# Patient Record
Sex: MALE | Race: WHITE | NOT HISPANIC OR LATINO | URBAN - METROPOLITAN AREA
[De-identification: names, ages, dates, MRNs, and addresses within clinical notes are randomized per-mention and may not be internally consistent; named-entity substitution may affect disease eponyms.]

---

## 2020-06-08 ENCOUNTER — EMERGENCY (EMERGENCY)
Facility: HOSPITAL | Age: 80
LOS: 1 days | Discharge: HOME | End: 2020-06-08
Attending: EMERGENCY MEDICINE
Payer: MEDICARE

## 2020-06-08 VITALS
RESPIRATION RATE: 18 BRPM | OXYGEN SATURATION: 98 % | WEIGHT: 184.97 LBS | TEMPERATURE: 98 F | SYSTOLIC BLOOD PRESSURE: 161 MMHG | HEIGHT: 72 IN | DIASTOLIC BLOOD PRESSURE: 84 MMHG | HEART RATE: 92 BPM

## 2020-06-08 LAB
ALBUMIN SERPL ELPH-MCNC: 4.8 G/DL — SIGNIFICANT CHANGE UP (ref 3.3–5)
ALP SERPL-CCNC: 50 U/L — SIGNIFICANT CHANGE UP (ref 40–120)
ALT FLD-CCNC: 17 U/L — SIGNIFICANT CHANGE UP (ref 10–45)
ANION GAP SERPL CALC-SCNC: 14 MMOL/L — SIGNIFICANT CHANGE UP (ref 5–17)
APPEARANCE UR: CLEAR — SIGNIFICANT CHANGE UP
APTT BLD: 29.3 SEC — SIGNIFICANT CHANGE UP (ref 27.5–36.3)
AST SERPL-CCNC: 25 U/L — SIGNIFICANT CHANGE UP (ref 10–40)
BASOPHILS # BLD AUTO: 0.03 K/UL — SIGNIFICANT CHANGE UP (ref 0–0.2)
BASOPHILS NFR BLD AUTO: 0.4 % — SIGNIFICANT CHANGE UP (ref 0–2)
BILIRUB SERPL-MCNC: 1.4 MG/DL — HIGH (ref 0.2–1.2)
BILIRUB UR-MCNC: NEGATIVE — SIGNIFICANT CHANGE UP
BUN SERPL-MCNC: 30 MG/DL — HIGH (ref 7–23)
CALCIUM SERPL-MCNC: 9.7 MG/DL — SIGNIFICANT CHANGE UP (ref 8.4–10.5)
CHLORIDE SERPL-SCNC: 100 MMOL/L — SIGNIFICANT CHANGE UP (ref 96–108)
CO2 SERPL-SCNC: 24 MMOL/L — SIGNIFICANT CHANGE UP (ref 22–31)
COLOR SPEC: SIGNIFICANT CHANGE UP
CREAT SERPL-MCNC: 1.45 MG/DL — HIGH (ref 0.5–1.3)
DIFF PNL FLD: ABNORMAL
EOSINOPHIL # BLD AUTO: 0.02 K/UL — SIGNIFICANT CHANGE UP (ref 0–0.5)
EOSINOPHIL NFR BLD AUTO: 0.2 % — SIGNIFICANT CHANGE UP (ref 0–6)
GLUCOSE SERPL-MCNC: 117 MG/DL — HIGH (ref 70–99)
GLUCOSE UR QL: NEGATIVE — SIGNIFICANT CHANGE UP
HCT VFR BLD CALC: 45.7 % — SIGNIFICANT CHANGE UP (ref 39–50)
HGB BLD-MCNC: 14.9 G/DL — SIGNIFICANT CHANGE UP (ref 13–17)
IMM GRANULOCYTES NFR BLD AUTO: 0.4 % — SIGNIFICANT CHANGE UP (ref 0–1.5)
INR BLD: 1.04 RATIO — SIGNIFICANT CHANGE UP (ref 0.88–1.16)
KETONES UR-MCNC: NEGATIVE — SIGNIFICANT CHANGE UP
LEUKOCYTE ESTERASE UR-ACNC: NEGATIVE — SIGNIFICANT CHANGE UP
LYMPHOCYTES # BLD AUTO: 1.2 K/UL — SIGNIFICANT CHANGE UP (ref 1–3.3)
LYMPHOCYTES # BLD AUTO: 14.7 % — SIGNIFICANT CHANGE UP (ref 13–44)
MAGNESIUM SERPL-MCNC: 2.1 MG/DL — SIGNIFICANT CHANGE UP (ref 1.6–2.6)
MCHC RBC-ENTMCNC: 30.7 PG — SIGNIFICANT CHANGE UP (ref 27–34)
MCHC RBC-ENTMCNC: 32.6 GM/DL — SIGNIFICANT CHANGE UP (ref 32–36)
MCV RBC AUTO: 94.2 FL — SIGNIFICANT CHANGE UP (ref 80–100)
MONOCYTES # BLD AUTO: 0.67 K/UL — SIGNIFICANT CHANGE UP (ref 0–0.9)
MONOCYTES NFR BLD AUTO: 8.2 % — SIGNIFICANT CHANGE UP (ref 2–14)
NEUTROPHILS # BLD AUTO: 6.2 K/UL — SIGNIFICANT CHANGE UP (ref 1.8–7.4)
NEUTROPHILS NFR BLD AUTO: 76.1 % — SIGNIFICANT CHANGE UP (ref 43–77)
NITRITE UR-MCNC: NEGATIVE — SIGNIFICANT CHANGE UP
NRBC # BLD: 0 /100 WBCS — SIGNIFICANT CHANGE UP (ref 0–0)
PH UR: 6 — SIGNIFICANT CHANGE UP (ref 5–8)
PHOSPHATE SERPL-MCNC: 3.2 MG/DL — SIGNIFICANT CHANGE UP (ref 2.5–4.5)
PLATELET # BLD AUTO: 151 K/UL — SIGNIFICANT CHANGE UP (ref 150–400)
POTASSIUM SERPL-MCNC: 4 MMOL/L — SIGNIFICANT CHANGE UP (ref 3.5–5.3)
POTASSIUM SERPL-SCNC: 4 MMOL/L — SIGNIFICANT CHANGE UP (ref 3.5–5.3)
PROT SERPL-MCNC: 7.4 G/DL — SIGNIFICANT CHANGE UP (ref 6–8.3)
PROT UR-MCNC: NEGATIVE — SIGNIFICANT CHANGE UP
PROTHROM AB SERPL-ACNC: 12 SEC — SIGNIFICANT CHANGE UP (ref 10–12.9)
RBC # BLD: 4.85 M/UL — SIGNIFICANT CHANGE UP (ref 4.2–5.8)
RBC # FLD: 14.6 % — HIGH (ref 10.3–14.5)
SODIUM SERPL-SCNC: 138 MMOL/L — SIGNIFICANT CHANGE UP (ref 135–145)
SP GR SPEC: 1.01 — SIGNIFICANT CHANGE UP (ref 1.01–1.02)
UROBILINOGEN FLD QL: NEGATIVE — SIGNIFICANT CHANGE UP
WBC # BLD: 8.15 K/UL — SIGNIFICANT CHANGE UP (ref 3.8–10.5)
WBC # FLD AUTO: 8.15 K/UL — SIGNIFICANT CHANGE UP (ref 3.8–10.5)

## 2020-06-08 PROCEDURE — 70450 CT HEAD/BRAIN W/O DYE: CPT | Mod: 26

## 2020-06-08 PROCEDURE — 71045 X-RAY EXAM CHEST 1 VIEW: CPT | Mod: 26

## 2020-06-08 PROCEDURE — 93010 ELECTROCARDIOGRAM REPORT: CPT | Mod: GC

## 2020-06-08 PROCEDURE — 99285 EMERGENCY DEPT VISIT HI MDM: CPT | Mod: GC

## 2020-06-08 RX ORDER — SODIUM CHLORIDE 9 MG/ML
1000 INJECTION INTRAMUSCULAR; INTRAVENOUS; SUBCUTANEOUS ONCE
Refills: 0 | Status: COMPLETED | OUTPATIENT
Start: 2020-06-08 | End: 2020-06-08

## 2020-06-08 RX ADMIN — SODIUM CHLORIDE 2000 MILLILITER(S): 9 INJECTION INTRAMUSCULAR; INTRAVENOUS; SUBCUTANEOUS at 21:10

## 2020-06-08 NOTE — ED PROVIDER NOTE - CARE PLAN
Principal Discharge DX:	Tingling of left upper extremity Principal Discharge DX:	Tingling of left upper extremity  Secondary Diagnosis:	MARVEL (acute kidney injury)

## 2020-06-08 NOTE — ED PROVIDER NOTE - PATIENT PORTAL LINK FT
You can access the FollowMyHealth Patient Portal offered by [facility] by registering at the following website: [email or web address]. By joining [employer]’s FollowMyHealth portal, you will also be able to view your health information using other applications (apps) compatible with our system.

## 2020-06-08 NOTE — ED PROVIDER NOTE - CARE PROVIDER_API CALL
Miguel Mays  NEUROLOGY  [address], [address]  [place] [address]  Phone: [phone]  Fax: [phone]  Follow Up Time:

## 2020-06-08 NOTE — ED PROVIDER NOTE - ATTENDING CONTRIBUTION TO CARE
79M, pmh gout, scc of head and neck, bcc, s/p chemo and radiation, presents s/p two episodes of L arm/L thigh tingling, associated with some lightheadedness, each lasting <1 minute. pt states yesterday when awoke had tingling, primarily to L arm but radiated up to shoulder/neck, with some associated tingling to L thigh. lasted for a period of seconds. today had similar presentation however lasted shorter period. denies any associated cp, sob, palpitations, vision changes, weakness, changes to speech, confusion, diff ambulating. no neck pain or trauma. has not experienced similar sx before. no hx of acs. no family hx of MI.     PE: NAD, NCAT, MMM, Trachea midline, No carotid bruits, Normal conjunctiva, lungs CTAB, S1/S2 RRR, Normal perfusion, 2+ radial pulses bilat, Abdomen Soft, NTND, No rebound/guarding, No LE edema, No deformity of extremities, No rashes,  No focal motor or sensory deficits. CN II-XII intact. Visual fields intact. EOMI, PERRLA. Facial sensation equal bilat. Smile and eye closure equal bilat. Hearing intact bilat. Palate elevation equal, tongue protrusion midline. Lateral head rotation equal bilat. 5/5 strength UE and LE bilat. Sensation grossly intact. No pronator drift. Normal finger to nose. Normal gait.     Mild HTN. Pt well appearing. Neuro exam completely wnl. EKG does reveal RBBB, unclear if new. Given numbness radiating to shoulder/jaw, consider acs, check troponin, though no acute ischemic changes on ekg. Much lower suspicion of TIA, episodes lasted <1 minute (felt like someone brushing up on me), check CT head. Check CBC eval for anemia, cmp eval for metabolic derangement. CXR. Re-eval. - Washington Jimenez MD

## 2020-06-08 NOTE — ED ADULT NURSE NOTE - CHIEF COMPLAINT QUOTE
c/o intermittent episodes on left side numbness/tingling in arm/leg/jaw w/ lightheadedness since Sunday. Neg neuro deficits, Hx chemo/radiation

## 2020-06-08 NOTE — ED ADULT NURSE REASSESSMENT NOTE - NS ED NURSE REASSESS COMMENT FT1
Pt updated on POC. Pt will contact pt family as pt will not be d/c until at least 0100 due to repeat Troponin and BMP at 0000.

## 2020-06-08 NOTE — ED PROVIDER NOTE - NSFOLLOWUPINSTRUCTIONS_ED_ALL_ED_FT
1) You were seen in the ER for tingling. The patient has been informed of all concerning signs and symptoms to return to Emergency Department, the necessity to follow up with PMD/Clinic/follow up provided within 2-3 days was explained, and the patient reports understanding of above with capacity and insight. You can look at the discharge papers for some examples of specific signs and symptoms to look out for.    2) Please follow up with NEUROLOGY within a week, especially if tingling continues.     3) Please take Tylenol 650mg every 4-6 hours as needed for pain.    4) Please return to ED for any new or worsening symptoms. Please follow up with NEUROLOGY tomorrow, Call Dr Mays in the morning to confirm appointment for MRI of brain!!    - Please see your Cardiologist tomorrow as scheduled.     2) Please take 4 baby Aspirin once a day for the next 2 days, then ask Dr Mays tomorrow if you can go back to taking just 1-baby aspirin.      3) Please return to ED for any new or worsening symptoms.    4) Your PMD should continue to follow Kidney function. Your Creatine in the hospital was 1.47

## 2020-06-08 NOTE — ED PROVIDER NOTE - OBJECTIVE STATEMENT
79M hx gout, SCC head and neck and basal cell carcinoma s/p chemo and radiating (last time 2 yrs ago) presents with L sided tingling episodic, up to the jaw with some lightheadedness, lasting 1 minute each. 1st episode sunday when he woke, thought he might have slept on it wrong. 2nd episode today while opening his fridge. no falling. Denies any facial droop, slurred speech, difficulty speaking or word finding, difficulty swallowing, focal weakness, LOC, difficulty walking, imbalance, cp, sob.

## 2020-06-08 NOTE — ED ADULT TRIAGE NOTE - CHIEF COMPLAINT QUOTE
c/o intermittent episodes on left side numbness/tingling in arm/leg/jaw w/o lightheadedness since Sunday. Neg neuro deficits, Hx chemo/radiation c/o intermittent episodes on left side numbness/tingling in arm/leg/jaw w/ lightheadedness since Sunday. Neg neuro deficits, Hx chemo/radiation

## 2020-06-08 NOTE — ED PROVIDER NOTE - CLINICAL SUMMARY MEDICAL DECISION MAKING FREE TEXT BOX
79M hx gout, SCC head and neck and basal cell carcinoma s/p chemo and radiating (last time 2 yrs ago) presents with L sided tingling episodic up to the jaw with some lightheadeness, lasting 1 minute each. check lytes and CTH, EKG and trop for acs work up, hydrate and reassess.

## 2020-06-08 NOTE — ED PROVIDER NOTE - PROGRESS NOTE DETAILS
Dr De La Torre: CT head no bleed, lytes wnl. Cr elevated, will recheck after ivf to see if improves. First trop 25, 3 hr rpt pending - no chest pain or SOB. Neuro resident Sent pt info to [facility] for appt tomorrow for MRI.

## 2020-06-08 NOTE — ED PROVIDER NOTE - PHYSICAL EXAMINATION
GEN: Well appearing, well nourished, in no apparent distress.  HEAD: NCAT  HEENT: PERRL, EOMI, no nystagmus, no facial droop, Airway patent, uvula midline, MMM, neck supple, no LAD, no JVD, no raccoon sign, no solorzano sign   LUNG: CTAB, no adventitious sounds, no retractions, no nasal flaring  CV: RRR, no murmurs,   Abd: soft, NTND, no rebound or guarding, BS+ in all quadrants, no CVAT  MSK: WWP, Pulses 2+ in extremities, No edema, no visible deformities, strength 5/5 in all extremities, FROM, no midspine TTP  Neuro:  CN II-XII in tact. AAOx3, Ambulatory with stable gait. sensations in tact in all extremities, neg pronator drift, neg finger to nose,  neg romberg  Skin: Warm and dry, no evidence of rash  Psych: normal mood and affect

## 2020-06-08 NOTE — ED ADULT NURSE NOTE - OBJECTIVE STATEMENT
79 year old Male, PMH: squamous cell ca on right side of neck- tx: chemo and radiation, last tx 2 years 2018, gout, lithotripsy- kidney stone. Patient comes to the ER after experiencing left sided numbness and tingling from toes to jaw on right side on Saturday upon awakening which resolved within a minute. Patient had a similar episode while opening the refrigerator which lasted less than a minute. Patient also reports recent pain in bilateral calves when going to sleep at night. Patient denies numbness and tingling at this time. A&Ox4, breathing spontaneous and unlabored, palpable pulses, skin color normal for ethnicity, moves all extremities, b/l +PERRL, equal strength in upper and lower extremities, ambulates without assistance with steady gait noted. Denies chest pain, shortness of breath, abdominal pain, headache, vision changes, fevers, chills, cough, nausea, vomiting, diarrhea, urinary symptoms. Bed locked and in lowest position with side rails up for safety.

## 2020-06-08 NOTE — ED PROVIDER NOTE - NS ED ROS FT
Constitutional: no fevers, no chills.  Eyes: no visual changes.  Ears: no ear drainage, no ear pain.  Nose: no nasal congestion.  Mouth/Throat: no sore throat.  Cardiovascular: no chest pain.  Respiratory: no shortness of breath, no wheezing, no cough  Gastrointestinal: no nausea, no vomiting, no diarrhea, no abdominal pain.  MSK: no flank pain, no back pain.  Genitourinary: no dysuria, no hematuria.  Skin: no rashes.  Neuro: no headache +tingling L sided

## 2020-06-09 VITALS
OXYGEN SATURATION: 98 % | DIASTOLIC BLOOD PRESSURE: 76 MMHG | TEMPERATURE: 98 F | RESPIRATION RATE: 18 BRPM | HEART RATE: 75 BPM | SYSTOLIC BLOOD PRESSURE: 138 MMHG

## 2020-06-09 LAB
ANION GAP SERPL CALC-SCNC: 12 MMOL/L — SIGNIFICANT CHANGE UP (ref 5–17)
BUN SERPL-MCNC: 29 MG/DL — HIGH (ref 7–23)
CALCIUM SERPL-MCNC: 9.3 MG/DL — SIGNIFICANT CHANGE UP (ref 8.4–10.5)
CHLORIDE SERPL-SCNC: 104 MMOL/L — SIGNIFICANT CHANGE UP (ref 96–108)
CO2 SERPL-SCNC: 22 MMOL/L — SIGNIFICANT CHANGE UP (ref 22–31)
CREAT SERPL-MCNC: 1.31 MG/DL — HIGH (ref 0.5–1.3)
CULTURE RESULTS: NO GROWTH — SIGNIFICANT CHANGE UP
GLUCOSE SERPL-MCNC: 112 MG/DL — HIGH (ref 70–99)
POTASSIUM SERPL-MCNC: 4 MMOL/L — SIGNIFICANT CHANGE UP (ref 3.5–5.3)
POTASSIUM SERPL-SCNC: 4 MMOL/L — SIGNIFICANT CHANGE UP (ref 3.5–5.3)
SODIUM SERPL-SCNC: 138 MMOL/L — SIGNIFICANT CHANGE UP (ref 135–145)
SPECIMEN SOURCE: SIGNIFICANT CHANGE UP
TROPONIN T, HIGH SENSITIVITY RESULT: 26 NG/L — SIGNIFICANT CHANGE UP (ref 0–51)

## 2020-06-09 PROCEDURE — 83735 ASSAY OF MAGNESIUM: CPT

## 2020-06-09 PROCEDURE — 71045 X-RAY EXAM CHEST 1 VIEW: CPT

## 2020-06-09 PROCEDURE — 85730 THROMBOPLASTIN TIME PARTIAL: CPT

## 2020-06-09 PROCEDURE — 87086 URINE CULTURE/COLONY COUNT: CPT

## 2020-06-09 PROCEDURE — 93005 ELECTROCARDIOGRAM TRACING: CPT

## 2020-06-09 PROCEDURE — 84484 ASSAY OF TROPONIN QUANT: CPT

## 2020-06-09 PROCEDURE — 99285 EMERGENCY DEPT VISIT HI MDM: CPT | Mod: 25

## 2020-06-09 PROCEDURE — 81001 URINALYSIS AUTO W/SCOPE: CPT

## 2020-06-09 PROCEDURE — 70450 CT HEAD/BRAIN W/O DYE: CPT

## 2020-06-09 PROCEDURE — 82553 CREATINE MB FRACTION: CPT

## 2020-06-09 PROCEDURE — 84100 ASSAY OF PHOSPHORUS: CPT

## 2020-06-09 PROCEDURE — 85610 PROTHROMBIN TIME: CPT

## 2020-06-09 PROCEDURE — 80048 BASIC METABOLIC PNL TOTAL CA: CPT

## 2020-06-09 PROCEDURE — 80053 COMPREHEN METABOLIC PANEL: CPT

## 2020-06-09 PROCEDURE — 82550 ASSAY OF CK (CPK): CPT

## 2020-06-09 PROCEDURE — 85027 COMPLETE CBC AUTOMATED: CPT

## 2020-06-26 ENCOUNTER — APPOINTMENT (OUTPATIENT)
Dept: FAMILY MEDICINE | Facility: CLINIC | Age: 80
End: 2020-06-26
Payer: MEDICARE

## 2020-06-26 VITALS
BODY MASS INDEX: 26.04 KG/M2 | HEIGHT: 71 IN | HEART RATE: 82 BPM | WEIGHT: 186 LBS | TEMPERATURE: 98.3 F | SYSTOLIC BLOOD PRESSURE: 150 MMHG | DIASTOLIC BLOOD PRESSURE: 76 MMHG | OXYGEN SATURATION: 98 %

## 2020-06-26 DIAGNOSIS — C44.91 BASAL CELL CARCINOMA OF SKIN, UNSPECIFIED: ICD-10-CM

## 2020-06-26 DIAGNOSIS — Z78.9 OTHER SPECIFIED HEALTH STATUS: ICD-10-CM

## 2020-06-26 PROCEDURE — 36415 COLL VENOUS BLD VENIPUNCTURE: CPT

## 2020-06-26 PROCEDURE — 99205 OFFICE O/P NEW HI 60 MIN: CPT | Mod: 25

## 2020-06-26 NOTE — COUNSELING
[Fall prevention counseling provided] : Fall prevention counseling provided [Adequate lighting] : Adequate lighting [No throw rugs] : No throw rugs [Use proper foot wear] : Use proper foot wear [Sleep ___ hours/day] : Sleep [unfilled] hours/day [Behavioral health counseling provided] : Behavioral health counseling provided [Plan in advance] : Plan in advance [AUDIT-C Screening administered and reviewed] : AUDIT-C Screening administered and reviewed [Engage in a relaxing activity] : Engage in a relaxing activity [None] : None [Good understanding] : Patient has a good understanding of lifestyle changes and steps needed to achieve self management goal

## 2020-06-26 NOTE — PHYSICAL EXAM
[Well Nourished] : well nourished [Well Developed] : well developed [No Acute Distress] : no acute distress [Normal Sclera/Conjunctiva] : normal sclera/conjunctiva [Well-Appearing] : well-appearing [PERRL] : pupils equal round and reactive to light [Normal Outer Ear/Nose] : the outer ears and nose were normal in appearance [EOMI] : extraocular movements intact [Normal Oropharynx] : the oropharynx was normal [Supple] : supple [No JVD] : no jugular venous distention [No Lymphadenopathy] : no lymphadenopathy [No Accessory Muscle Use] : no accessory muscle use [No Respiratory Distress] : no respiratory distress  [Thyroid Normal, No Nodules] : the thyroid was normal and there were no nodules present [Normal Rate] : normal rate  [Clear to Auscultation] : lungs were clear to auscultation bilaterally [Regular Rhythm] : with a regular rhythm [Normal S1, S2] : normal S1 and S2 [No Carotid Bruits] : no carotid bruits [No Murmur] : no murmur heard [No Abdominal Bruit] : a ~M bruit was not heard ~T in the abdomen [No Varicosities] : no varicosities [Pedal Pulses Present] : the pedal pulses are present [No Edema] : there was no peripheral edema [No Palpable Aorta] : no palpable aorta [No Extremity Clubbing/Cyanosis] : no extremity clubbing/cyanosis [Soft] : abdomen soft [Non-distended] : non-distended [No Masses] : no abdominal mass palpated [Non Tender] : non-tender [Normal Bowel Sounds] : normal bowel sounds [No HSM] : no HSM [Normal Posterior Cervical Nodes] : no posterior cervical lymphadenopathy [No Spinal Tenderness] : no spinal tenderness [Normal Anterior Cervical Nodes] : no anterior cervical lymphadenopathy [No CVA Tenderness] : no CVA  tenderness [No Joint Swelling] : no joint swelling [No Rash] : no rash [Coordination Grossly Intact] : coordination grossly intact [Grossly Normal Strength/Tone] : grossly normal strength/tone [No Focal Deficits] : no focal deficits [Normal Gait] : normal gait [Normal Affect] : the affect was normal [Deep Tendon Reflexes (DTR)] : deep tendon reflexes were 2+ and symmetric [Normal Insight/Judgement] : insight and judgment were intact

## 2020-06-26 NOTE — HEALTH RISK ASSESSMENT
[Very Good] : ~his/her~  mood as very good [Monthly or less (1 pt)] : Monthly or less (1 point) [Yes] : Yes [1 or 2 (0 pts)] : 1 or 2 (0 points) [No] : In the past 12 months have you used drugs other than those required for medical reasons? No [Never (0 pts)] : Never (0 points) [Patient declined colonoscopy] : Patient declined colonoscopy [Patient declined bone density test] : Patient declined bone density test [HIV test declined] : HIV test declined [None] : None [With Significant Other] : lives with significant other [Retired] : retired [] :  [# Of Children ___] : has [unfilled] children [Sexually Active] : sexually active [Feels Safe at Home] : Feels safe at home [Fully functional (bathing, dressing, toileting, transferring, walking, feeding)] : Fully functional (bathing, dressing, toileting, transferring, walking, feeding) [Fully functional (using the telephone, shopping, preparing meals, housekeeping, doing laundry, using] : Fully functional and needs no help or supervision to perform IADLs (using the telephone, shopping, preparing meals, housekeeping, doing laundry, using transportation, managing medications and managing finances) [Reports normal functional visual acuity (ie: able to read med bottle)] : Reports normal functional visual acuity [Carbon Monoxide Detector] : carbon monoxide detector [Smoke Detector] : smoke detector [Seat Belt] :  uses seat belt [Sunscreen] : uses sunscreen [Safety elements used in home] : safety elements used in home [With Patient/Caregiver] : With Patient/Caregiver [Designated Healthcare Proxy] : Designated healthcare proxy [Name: ___] : Health Care Proxy's Name: [unfilled]  [Aggressive treatment] : aggressive treatment [Relationship: ___] : Relationship: [unfilled] [] : No [Audit-CScore] : 1 [Change in mental status noted] : No change in mental status noted [Language] : denies difficulty with language [Behavior] : denies difficulty with behavior [Learning/Retaining New Information] : denies difficulty learning/retaining new information [Handling Complex Tasks] : denies difficulty handling complex tasks [Reasoning] : denies difficulty with reasoning [Spatial Ability and Orientation] : denies difficulty with spatial ability and orientation [High Risk Behavior] : no high risk behavior [Reports changes in vision] : Reports no changes in vision [Reports changes in hearing] : Reports no changes in hearing [Travel to Developing Areas] : does not  travel to developing areas [TB Exposure] : is not being exposed to tuberculosis [Reports changes in dental health] : Reports no changes in dental health [Caregiver Concerns] : does not have caregiver concerns [AdvancecareDate] : 06/20

## 2020-06-26 NOTE — REVIEW OF SYSTEMS
[Dizziness] : dizziness [Fainting] : fainting [Negative] : Heme/Lymph [Confusion] : no confusion [Headache] : no headache [Memory Loss] : no memory loss [Unsteady Walk] : no ataxia

## 2020-06-26 NOTE — HISTORY OF PRESENT ILLNESS
[Spouse] : spouse [FreeTextEntry1] : See HPI [de-identified] : Is a 79-year-old gentleman who presents today to establish himself as a patient recently was seen in the emergency department for what appeared to be TIA patient had comprehensive work-up and cardiology and in neurology all tests which I reviewed and are presently in the chart were basically within normal limits.  Presently the patient is awake alert and oriented x3 he is in no acute distress he is calm and cooperative in fact according to the patient he is back to baseline.\par \par Patient's medical history is significant for episodes which are reminiscent of vasovagal episodes only 2 episodes of syncope.  Patient has history of head and neck squamous cell carcinoma which has been treated with radiation therapy and chemotherapy basal cell carcinoma which was excised nephrolithiasis status post lithotripsy presently the patient denies any chest pain shortness of breath nausea or vomiting he is accompanied by his spouse.

## 2020-06-26 NOTE — ASSESSMENT
[FreeTextEntry1] : Assessment and plan:\par \par Comprehensive physical exam was unremarkable there were no neurological deficits I recommend that the patient continue present medical management he has not started yet clopidogrel nor atorvastatin I recommend that he does even though he is totally asymptomatic and all deficits have resolved this is protective.  Detailed discussion with patient and his wife.\par \par Reviewed with patient most recent hospitalization and all tests that were done which include CT scan, MRI, echocardiograms, electrocardiograms, Holter monitor, stress test blood work etc.\par \par Comprehensive blood work was drawn in office by examiner\par \par Face-to-face with patient 65 minutes

## 2020-06-29 LAB
ALBUMIN SERPL ELPH-MCNC: 4.8 G/DL
ALP BLD-CCNC: 50 U/L
ALT SERPL-CCNC: 18 U/L
ANION GAP SERPL CALC-SCNC: 12 MMOL/L
AST SERPL-CCNC: 23 U/L
BASOPHILS # BLD AUTO: 0.02 K/UL
BASOPHILS NFR BLD AUTO: 0.3 %
BILIRUB SERPL-MCNC: 1.3 MG/DL
BUN SERPL-MCNC: 26 MG/DL
CALCIUM SERPL-MCNC: 10 MG/DL
CHLORIDE SERPL-SCNC: 102 MMOL/L
CHOLEST SERPL-MCNC: 122 MG/DL
CHOLEST/HDLC SERPL: 2 RATIO
CO2 SERPL-SCNC: 26 MMOL/L
CREAT SERPL-MCNC: 1.38 MG/DL
EOSINOPHIL # BLD AUTO: 0.06 K/UL
EOSINOPHIL NFR BLD AUTO: 0.9 %
ESTIMATED AVERAGE GLUCOSE: 103 MG/DL
GLUCOSE SERPL-MCNC: 97 MG/DL
HBA1C MFR BLD HPLC: 5.2 %
HCT VFR BLD CALC: 47 %
HDLC SERPL-MCNC: 61 MG/DL
HGB BLD-MCNC: 14.8 G/DL
IMM GRANULOCYTES NFR BLD AUTO: 0.7 %
LDLC SERPL CALC-MCNC: 46 MG/DL
LYMPHOCYTES # BLD AUTO: 1.33 K/UL
LYMPHOCYTES NFR BLD AUTO: 19.6 %
MAN DIFF?: NORMAL
MCHC RBC-ENTMCNC: 30.7 PG
MCHC RBC-ENTMCNC: 31.5 GM/DL
MCV RBC AUTO: 97.5 FL
MONOCYTES # BLD AUTO: 0.66 K/UL
MONOCYTES NFR BLD AUTO: 9.7 %
NEUTROPHILS # BLD AUTO: 4.65 K/UL
NEUTROPHILS NFR BLD AUTO: 68.8 %
PLATELET # BLD AUTO: 142 K/UL
POTASSIUM SERPL-SCNC: 4.6 MMOL/L
PROT SERPL-MCNC: 6.8 G/DL
RBC # BLD: 4.82 M/UL
RBC # FLD: 15.1 %
RPR SER-TITR: NORMAL
SODIUM SERPL-SCNC: 140 MMOL/L
T4 FREE SERPL-MCNC: 1.3 NG/DL
TRIGL SERPL-MCNC: 77 MG/DL
TSH SERPL-ACNC: 1.76 UIU/ML
URATE SERPL-MCNC: 5 MG/DL
WBC # FLD AUTO: 6.77 K/UL

## 2020-07-03 LAB — HEMOCCULT STL QL IA: NEGATIVE

## 2020-07-16 PROBLEM — C44.92 SQUAMOUS CELL CARCINOMA OF SKIN, UNSPECIFIED: Chronic | Status: ACTIVE | Noted: 2020-06-08

## 2020-07-16 PROBLEM — C44.91 BASAL CELL CARCINOMA OF SKIN, UNSPECIFIED: Chronic | Status: ACTIVE | Noted: 2020-06-08

## 2020-07-16 PROBLEM — M10.9 GOUT, UNSPECIFIED: Chronic | Status: ACTIVE | Noted: 2020-06-08

## 2020-08-12 DIAGNOSIS — R53.1 WEAKNESS: ICD-10-CM

## 2020-09-15 ENCOUNTER — APPOINTMENT (OUTPATIENT)
Dept: FAMILY MEDICINE | Facility: CLINIC | Age: 80
End: 2020-09-15
Payer: MEDICARE

## 2020-09-15 VITALS
RESPIRATION RATE: 14 BRPM | TEMPERATURE: 98 F | DIASTOLIC BLOOD PRESSURE: 76 MMHG | OXYGEN SATURATION: 98 % | WEIGHT: 190 LBS | HEART RATE: 85 BPM | HEIGHT: 71 IN | BODY MASS INDEX: 26.6 KG/M2 | SYSTOLIC BLOOD PRESSURE: 144 MMHG

## 2020-09-15 DIAGNOSIS — R79.9 ABNORMAL FINDING OF BLOOD CHEMISTRY, UNSPECIFIED: ICD-10-CM

## 2020-09-15 DIAGNOSIS — Z23 ENCOUNTER FOR IMMUNIZATION: ICD-10-CM

## 2020-09-15 DIAGNOSIS — N20.0 CALCULUS OF KIDNEY: ICD-10-CM

## 2020-09-15 PROCEDURE — 99214 OFFICE O/P EST MOD 30 MIN: CPT

## 2020-09-15 NOTE — HISTORY OF PRESENT ILLNESS
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  PET scan was done and patient was cleared patient will be following up with [facility].\par \par Patient brought in all tests that were done including lab work and bilirubin is slightly elevated at 1.4 his creatinine has normalized to 1.3 I reviewed blood work with patient.\par \par Patient is accompanied by his wife he presently denies any chest pain shortness of breath nausea or vomiting medical history is significant for abnormal finding of blood chemistries which was elevated creatinine and hyperbilirubinemia which appears to be stable, gout, nephrolithiasis, TIA patient has not experienced any TIAs since his last visit most likely which was a vasovagal episode.\par \par Patient has history of BPH was placed on tamsulosin 0.4 mg 2 capsules at bedtime unfortunately patient states that when he takes 2 capsules he is lightheaded he was not taken 2 capsules at bedtime he was taken 1 capsule in the morning and 1 capsule at bedtime but he has been feeling lightheaded.

## 2020-09-15 NOTE — ASSESSMENT
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  Gout well-controlled with allopurinol 300 mg at bedtime prescription was sent to local pharmacy and a 90-day supply was sent to mail away.\par \par 2.  TIA I recommend continuing aspirin 81 mg p.o. daily and atorvastatin 10 mg at bedtime his cholesterol levels were excellent.  Patient is also on clopidogrel.\par \par 3.  BPH since the patient is feeling lightheaded with 2 capsules of tamsulosin I recommend he take only 1 capsule at bedtime.  I discussed with patient the possible side effects which may be lightheaded I told him that if he needs to get up at night to sit at the edge of the bed and then to get up slowly.\par \par 4.  No change in present medical management health maintenance issues discussed with patient and wife influenza vaccine offered patient presently declines.

## 2020-09-15 NOTE — REVIEW OF SYSTEMS
[Headache] : no headache [Dizziness] : dizziness [Fainting] : fainting [Unsteady Walk] : no ataxia [Confusion] : no confusion [Memory Loss] : no memory loss [Negative] : Heme/Lymph

## 2020-09-15 NOTE — PHYSICAL EXAM
[Well Nourished] : well nourished [No Acute Distress] : no acute distress [Well Developed] : well developed [Well-Appearing] : well-appearing [Normal Sclera/Conjunctiva] : normal sclera/conjunctiva [PERRL] : pupils equal round and reactive to light [EOMI] : extraocular movements intact [Normal Outer Ear/Nose] : the outer ears and nose were normal in appearance [Normal Oropharynx] : the oropharynx was normal [No Lymphadenopathy] : no lymphadenopathy [No JVD] : no jugular venous distention [Supple] : supple [Thyroid Normal, No Nodules] : the thyroid was normal and there were no nodules present [No Respiratory Distress] : no respiratory distress  [No Accessory Muscle Use] : no accessory muscle use [Clear to Auscultation] : lungs were clear to auscultation bilaterally [Normal Rate] : normal rate  [Regular Rhythm] : with a regular rhythm [Normal S1, S2] : normal S1 and S2 [No Murmur] : no murmur heard [No Carotid Bruits] : no carotid bruits [No Abdominal Bruit] : a ~M bruit was not heard ~T in the abdomen [No Varicosities] : no varicosities [Pedal Pulses Present] : the pedal pulses are present [No Edema] : there was no peripheral edema [No Palpable Aorta] : no palpable aorta [No Extremity Clubbing/Cyanosis] : no extremity clubbing/cyanosis [Soft] : abdomen soft [Non Tender] : non-tender [Non-distended] : non-distended [No Masses] : no abdominal mass palpated [Normal Bowel Sounds] : normal bowel sounds [No HSM] : no HSM [Normal Posterior Cervical Nodes] : no posterior cervical lymphadenopathy [Normal Anterior Cervical Nodes] : no anterior cervical lymphadenopathy [No CVA Tenderness] : no CVA  tenderness [No Spinal Tenderness] : no spinal tenderness [No Joint Swelling] : no joint swelling [Grossly Normal Strength/Tone] : grossly normal strength/tone [No Rash] : no rash [Coordination Grossly Intact] : coordination grossly intact [No Focal Deficits] : no focal deficits [Deep Tendon Reflexes (DTR)] : deep tendon reflexes were 2+ and symmetric [Normal Gait] : normal gait [Normal Affect] : the affect was normal [Normal Insight/Judgement] : insight and judgment were intact

## 2021-02-25 ENCOUNTER — APPOINTMENT (OUTPATIENT)
Dept: FAMILY MEDICINE | Facility: CLINIC | Age: 81
End: 2021-02-25
Payer: MEDICARE

## 2021-02-25 VITALS
SYSTOLIC BLOOD PRESSURE: 146 MMHG | WEIGHT: 193 LBS | TEMPERATURE: 97.5 F | OXYGEN SATURATION: 98 % | HEART RATE: 92 BPM | BODY MASS INDEX: 27.02 KG/M2 | DIASTOLIC BLOOD PRESSURE: 68 MMHG | HEIGHT: 71 IN | RESPIRATION RATE: 16 BRPM

## 2021-02-25 DIAGNOSIS — G45.9 TRANSIENT CEREBRAL ISCHEMIC ATTACK, UNSPECIFIED: ICD-10-CM

## 2021-02-25 PROCEDURE — 99212 OFFICE O/P EST SF 10 MIN: CPT | Mod: 25

## 2021-02-25 PROCEDURE — 81002 URINALYSIS NONAUTO W/O SCOPE: CPT

## 2021-02-25 RX ORDER — CLOPIDOGREL BISULFATE 75 MG/1
75 TABLET, FILM COATED ORAL
Qty: 30 | Refills: 5 | Status: COMPLETED | COMMUNITY
Start: 2020-06-26 | End: 2021-02-25

## 2021-02-25 RX ORDER — ATORVASTATIN CALCIUM 10 MG/1
10 TABLET, FILM COATED ORAL
Qty: 30 | Refills: 5 | Status: COMPLETED | COMMUNITY
Start: 2020-06-26 | End: 2021-02-25

## 2021-02-25 NOTE — HISTORY OF PRESENT ILLNESS
[FreeTextEntry8] : Patient presents with intermittent mild urinary burning for about 3 days that has been slightly improving that is associated with questionable frequency. Denies fever, chills, abdominal/back pains, urgency, or acute hesitancy. \par \par POCT- UA - normal test \par \par \par \par

## 2021-02-26 LAB
APPEARANCE: CLEAR
BACTERIA: NEGATIVE
BILIRUBIN URINE: NEGATIVE
BLOOD URINE: NEGATIVE
COLOR: NORMAL
GLUCOSE QUALITATIVE U: NEGATIVE
HYALINE CASTS: 0 /LPF
KETONES URINE: NEGATIVE
LEUKOCYTE ESTERASE URINE: NEGATIVE
MICROSCOPIC-UA: NORMAL
NITRITE URINE: NEGATIVE
PH URINE: 6.5
PROTEIN URINE: NEGATIVE
RED BLOOD CELLS URINE: 2 /HPF
SPECIFIC GRAVITY URINE: 1.01
SQUAMOUS EPITHELIAL CELLS: 0 /HPF
UROBILINOGEN URINE: NORMAL
WHITE BLOOD CELLS URINE: 0 /HPF

## 2021-03-01 LAB — BACTERIA UR CULT: NORMAL

## 2021-04-28 ENCOUNTER — APPOINTMENT (OUTPATIENT)
Dept: FAMILY MEDICINE | Facility: CLINIC | Age: 81
End: 2021-04-28
Payer: MEDICARE

## 2021-04-28 VITALS
WEIGHT: 188 LBS | HEIGHT: 71 IN | HEART RATE: 87 BPM | BODY MASS INDEX: 26.32 KG/M2 | DIASTOLIC BLOOD PRESSURE: 74 MMHG | TEMPERATURE: 97.7 F | OXYGEN SATURATION: 98 % | RESPIRATION RATE: 16 BRPM | SYSTOLIC BLOOD PRESSURE: 146 MMHG

## 2021-04-28 PROCEDURE — 36415 COLL VENOUS BLD VENIPUNCTURE: CPT

## 2021-04-28 PROCEDURE — 99214 OFFICE O/P EST MOD 30 MIN: CPT | Mod: 25

## 2021-04-28 NOTE — PHYSICAL EXAM
The patient is a 58 year old female seen at her own request with complaints of hearing loss. She reports that she wore hearing aids in the past, but they never helped her, so she stopped wearing them. Numerous trips back to her hearing aid provider were not beneficial. She reports having difficulty understanding as well as hearing. She has had hearing problems for many years. She has bilateral tinnitus. She has left sided otalgia at times. She denies otorrhea. She has a history of headaches all over her head.     Allergies as of 06/06/2018 - Reviewed 05/24/2018   Allergen Reaction Noted   • Cymbalta [duloxetine HCl]  07/24/2013     LATEX: negative  MEDICAL HISTORY:  Her past history includes obesity, muscle tension headaches, migraines, depression, arthritis, COPD and hyperlipidemia.  SURGICAL HISTORY:  Past surgeries include T&A, bladder repair and orthopedic procedures.    CURRENT MEDICATIONS: Multiple, reviewed with the patient, including Effexor    SOCIAL HISTORY:    Tobacco Use: Yes           Packs/Day: .25   Years: .5         Comment: Pt has started smoking again.    Alcohol Use: Yes           0 - .6 oz/week       Comment: rarely. 5-6 drinks per month    FAMILY HISTORY:  Positive family history includes hearing loss in mother (she brought in her mother's hearing aids, asking if she could get similar ones)    REVIEW OF SYSTEMS:  Eye Problem(s):  glasses  ENT Problem(s): as noted   Cardiovascular problem(s):  negative  Respiratory problem(s): stable  Gastrointestinal problem(s):  negative   Genitourinary problem(s):  negative  Musculoskeletal problem(s):arthritis  Integumentary problem(s):  negative  Neurological problem(s):  negative  Psychiatric problem(s):  depression  Endocrine problem(s):  negative  Hematologic and/or lymphatic problem(s):  negative    PHYSICAL EXAM:    Alert, oriented times three, and in no apparent distress. Facial nerve function is normal. Affect normal. Nurse's notes reviewed. Vitals  noted.     EXTERNAL EAR:    Right: negative   Left: negative  CANALS:   Right: negative   Left: negative  TYMPANIC MEMBRANES:   Right: negative   Left: negative    No mastoid tenderness    NOSE:    External: negative    Mucosa: atrophic changes    Septum: right shift    Turbinates: negative    Sinuses: negative     ORAL:    Lips: negative    Teeth: edentulous    Mucosa: negative    Tongue: negative    Tonsils: absent     Temporomandibular joints: very tender bilaterally    NECK:    Salivary Glands: negative    Nodes: negative    Symmetric with some tightness posteriorly    The audiogram shows moderate flat pattern bilateral sensorineural hearing loss with excellent discrimination.  The results were discussed with the patient and new hearing aids were advised.     IMPRESSION:   She has moderate bilateral sensorineural hearing loss with excellent discrimination,. She should be able to get hearing aids that help her significantly. Her tinnitus may be due to her hearing loss, but is more likely due to her arthritis. This was all discussed with her.    TREATMENT:   I suggested that she set up a hearing aid evaluation with our audiology department. She is already receiving treatment for her arthritis.    [No Acute Distress] : no acute distress [Well Nourished] : well nourished [Well Developed] : well developed [Well-Appearing] : well-appearing [Normal Voice/Communication] : normal voice/communication [Normal Sclera/Conjunctiva] : normal sclera/conjunctiva [PERRL] : pupils equal round and reactive to light [EOMI] : extraocular movements intact [Normal Outer Ear/Nose] : the outer ears and nose were normal in appearance [Normal Oropharynx] : the oropharynx was normal [No JVD] : no jugular venous distention [No Lymphadenopathy] : no lymphadenopathy [Supple] : supple [Thyroid Normal, No Nodules] : the thyroid was normal and there were no nodules present [No Respiratory Distress] : no respiratory distress  [No Accessory Muscle Use] : no accessory muscle use [Clear to Auscultation] : lungs were clear to auscultation bilaterally [Normal Rate] : normal rate  [Regular Rhythm] : with a regular rhythm [Normal S1, S2] : normal S1 and S2 [No Murmur] : no murmur heard [No Carotid Bruits] : no carotid bruits [No Abdominal Bruit] : a ~M bruit was not heard ~T in the abdomen [No Varicosities] : no varicosities [Pedal Pulses Present] : the pedal pulses are present [No Edema] : there was no peripheral edema [No Palpable Aorta] : no palpable aorta [No Extremity Clubbing/Cyanosis] : no extremity clubbing/cyanosis [Soft] : abdomen soft [Non Tender] : non-tender [Non-distended] : non-distended [No Masses] : no abdominal mass palpated [No HSM] : no HSM [Normal Bowel Sounds] : normal bowel sounds [Normal Posterior Cervical Nodes] : no posterior cervical lymphadenopathy [Normal Anterior Cervical Nodes] : no anterior cervical lymphadenopathy [No CVA Tenderness] : no CVA  tenderness [No Spinal Tenderness] : no spinal tenderness [No Joint Swelling] : no joint swelling [Grossly Normal Strength/Tone] : grossly normal strength/tone [No Rash] : no rash [Coordination Grossly Intact] : coordination grossly intact [No Focal Deficits] : no focal deficits [Normal Gait] : normal gait [Deep Tendon Reflexes (DTR)] : deep tendon reflexes were 2+ and symmetric [Normal Affect] : the affect was normal [Normal Insight/Judgement] : insight and judgment were intact

## 2021-04-28 NOTE — HISTORY OF PRESENT ILLNESS
[Spouse] : spouse [FreeTextEntry1] : See HPI. [de-identified] : Patient is an 80-year-old gentleman who presents today for follow-up and disease management patient states that he is in his usual state of health presently denies any chest pain shortness of breath nausea or vomiting and he also denies any new episodes of vasovagal episodes.  We will be addressing hyperuricemia, vasovagal episodes, BPH and dysuria.  Presently the patient is awake alert and oriented x3 in no acute distress calm and cooperative.

## 2021-04-28 NOTE — HEALTH RISK ASSESSMENT
[] : No [No] : In the past 12 months have you used drugs other than those required for medical reasons? No [No falls in past year] : Patient reported no falls in the past year [0] : 2) Feeling down, depressed, or hopeless: Not at all (0) [[id number]] : 0 [Audit-CScore] : 0

## 2021-04-28 NOTE — ASSESSMENT
[FreeTextEntry1] : Assessment and plan:\par \par 1.  Gout well-controlled with allopurinol 300 mg at bedtime prescription was sent to local pharmacy and a 90-day supply was sent to mail away.\par \par 2.  TIA I recommend continuing aspirin 81 mg p.o. daily and atorvastatin 10 mg at bedtime his cholesterol levels were excellent.  Patient is also on clopidogrel.\par \par 3.  BPH since the patient is feeling lightheaded with 2 capsules of tamsulosin I recommend he take only 1 capsule at bedtime.  I discussed with patient the possible side effects which may be lightheaded I told him that if he needs to get up at night to sit at the edge of the bed and then to get up slowly.\par \par 4.  Patient with a history of squamous cell carcinoma of skin there are no visible new lesions patient has follow-up PET scan and will be seeing dermatology in the near future.\par \par 5.  Comprehensive blood work drawn in office by examiner.\par \par 6.  Fall precautions discussed with patient and spouse.

## 2021-04-29 LAB
ALBUMIN SERPL ELPH-MCNC: 4.4 G/DL
ALP BLD-CCNC: 49 U/L
ALT SERPL-CCNC: 18 U/L
ANION GAP SERPL CALC-SCNC: 13 MMOL/L
AST SERPL-CCNC: 24 U/L
BASOPHILS # BLD AUTO: 0.02 K/UL
BASOPHILS NFR BLD AUTO: 0.4 %
BILIRUB SERPL-MCNC: 1.5 MG/DL
BUN SERPL-MCNC: 25 MG/DL
CALCIUM SERPL-MCNC: 10 MG/DL
CHLORIDE SERPL-SCNC: 105 MMOL/L
CO2 SERPL-SCNC: 23 MMOL/L
CREAT SERPL-MCNC: 1.22 MG/DL
EOSINOPHIL # BLD AUTO: 0.03 K/UL
EOSINOPHIL NFR BLD AUTO: 0.6 %
GLUCOSE SERPL-MCNC: 116 MG/DL
HCT VFR BLD CALC: 47.8 %
HGB BLD-MCNC: 15.4 G/DL
IMM GRANULOCYTES NFR BLD AUTO: 0.4 %
LYMPHOCYTES # BLD AUTO: 1.2 K/UL
LYMPHOCYTES NFR BLD AUTO: 22.9 %
MAN DIFF?: NORMAL
MCHC RBC-ENTMCNC: 30.8 PG
MCHC RBC-ENTMCNC: 32.2 GM/DL
MCV RBC AUTO: 95.6 FL
MONOCYTES # BLD AUTO: 0.42 K/UL
MONOCYTES NFR BLD AUTO: 8 %
NEUTROPHILS # BLD AUTO: 3.56 K/UL
NEUTROPHILS NFR BLD AUTO: 67.7 %
PLATELET # BLD AUTO: 143 K/UL
POTASSIUM SERPL-SCNC: 4.1 MMOL/L
PROT SERPL-MCNC: 6.6 G/DL
PSA SERPL-MCNC: 4.23 NG/ML
RBC # BLD: 5 M/UL
RBC # FLD: 14.9 %
SODIUM SERPL-SCNC: 141 MMOL/L
T4 FREE SERPL-MCNC: 1.3 NG/DL
TSH SERPL-ACNC: 1.81 UIU/ML
URATE SERPL-MCNC: 3.9 MG/DL
WBC # FLD AUTO: 5.25 K/UL

## 2021-08-08 ENCOUNTER — RX RENEWAL (OUTPATIENT)
Age: 81
End: 2021-08-08

## 2021-09-14 ENCOUNTER — APPOINTMENT (OUTPATIENT)
Dept: FAMILY MEDICINE | Facility: CLINIC | Age: 81
End: 2021-09-14
Payer: MEDICARE

## 2021-09-14 VITALS
HEART RATE: 72 BPM | TEMPERATURE: 98 F | WEIGHT: 184.5 LBS | DIASTOLIC BLOOD PRESSURE: 79 MMHG | OXYGEN SATURATION: 98 % | SYSTOLIC BLOOD PRESSURE: 153 MMHG | RESPIRATION RATE: 14 BRPM | HEIGHT: 71 IN | BODY MASS INDEX: 25.83 KG/M2

## 2021-09-14 DIAGNOSIS — E80.6 OTHER DISORDERS OF BILIRUBIN METABOLISM: ICD-10-CM

## 2021-09-14 DIAGNOSIS — H91.90 UNSPECIFIED HEARING LOSS, UNSPECIFIED EAR: ICD-10-CM

## 2021-09-14 PROCEDURE — 99214 OFFICE O/P EST MOD 30 MIN: CPT | Mod: 25

## 2021-09-14 PROCEDURE — 36415 COLL VENOUS BLD VENIPUNCTURE: CPT

## 2021-09-14 PROCEDURE — G0008: CPT

## 2021-09-14 PROCEDURE — 90662 IIV NO PRSV INCREASED AG IM: CPT

## 2021-09-14 NOTE — HISTORY OF PRESENT ILLNESS
[FreeTextEntry1] : See HPI. [de-identified] : Patient is an 81-year-old gentleman who presents today for follow-up and disease management patient recently followed up at [facility] secondary to squamous cell carcinoma of head and neck patient was basically given a clean bill of health except his creatinine and BUN was slightly elevated I will be obtaining comprehensive blood work at today's visit.  Patient's medical history is significant for occasional feeling lightheaded status post an episode of vasovagal episode ever since we dropped the tamsulosin to 1 capsule at bedtime those episodes have resolved.  Medical history is significant for BPH, mild thrombocytopenia, CKD stage IIIa, and mild hyperbilirubinemia which is chronic.  Patient presents today with his spouse he is in no acute distress calm and cooperative.  He presently denies any chest pain or shortness of breath.  Still with

## 2021-09-14 NOTE — HEALTH RISK ASSESSMENT
[No] : In the past 12 months have you used drugs other than those required for medical reasons? No [0] : 2) Feeling down, depressed, or hopeless: Not at all (0) [PHQ-2 Negative - No further assessment needed] : PHQ-2 Negative - No further assessment needed [Any fall with injury in past year] : Patient reported fall with injury in the past year [] : No [Audit-CScore] : 0 [[id number]] : 0

## 2021-09-14 NOTE — ASSESSMENT
[FreeTextEntry1] : And plan:\par \par 1.  Vasovagal episode no further episodes ever since we decrease tamsulosin to 0.4 mg at bedtime.\par \par 2.  At his last visit he had a mild thrombocytopenia patient is followed also at [facility] due to the fact he has had head and neck squamous cell carcinoma which was treated with radiation therapy and also chemotherapy.  Patient complains of hearing loss and also dysgeusia most likely a sequelae of radiation therapy to the head and neck.  For completeness sake I will have patient establish himself with a new ear nose throat specialist seeing that his specialist has retired patient usually has ENT scoping on a yearly basis.\par \par 3.  Chronic kidney disease stage IIIa stable for patient at this visit comprehensive blood work was obtained which was drawn by examiner I did discuss with both patient and spouse to avoid nephrotoxic medications and to keep himself well-hydrated at least 64 ounces of fluid daily.\par \par 4.  Hyperuricemia continue allopurinol 300 mg p.o. daily I will check uric acid levels.\par \par 5.  Comprehensive blood work drawn in office by examiner, health maintenance issues discussed with patient patient is fully vaccinated against COVID-19 he has received both doses and now is waiting for booster of Moderna.  At this visit Fluzone high dose was administered.

## 2021-09-14 NOTE — REVIEW OF SYSTEMS
[Hearing Loss] : hearing loss [Negative] : Heme/Lymph [Earache] : no earache [Nosebleeds] : no nosebleeds [Postnasal Drip] : no postnasal drip [Nasal Discharge] : no nasal discharge [Sore Throat] : no sore throat [Hoarseness] : no hoarseness

## 2021-09-15 LAB
ALBUMIN SERPL ELPH-MCNC: 4.4 G/DL
ALP BLD-CCNC: 52 U/L
ALT SERPL-CCNC: 17 U/L
ANION GAP SERPL CALC-SCNC: 14 MMOL/L
AST SERPL-CCNC: 26 U/L
BASOPHILS # BLD AUTO: 0.03 K/UL
BASOPHILS NFR BLD AUTO: 0.6 %
BILIRUB SERPL-MCNC: 1.3 MG/DL
BUN SERPL-MCNC: 30 MG/DL
CALCIUM SERPL-MCNC: 9.9 MG/DL
CHLORIDE SERPL-SCNC: 104 MMOL/L
CO2 SERPL-SCNC: 23 MMOL/L
COVID-19 NUCLEOCAPSID  GAM ANTIBODY INTERPRETATION: NEGATIVE
COVID-19 SPIKE DOMAIN ANTIBODY INTERPRETATION: POSITIVE
CREAT SERPL-MCNC: 1.19 MG/DL
EOSINOPHIL # BLD AUTO: 0.07 K/UL
EOSINOPHIL NFR BLD AUTO: 1.3 %
ESTIMATED AVERAGE GLUCOSE: 105 MG/DL
GLUCOSE SERPL-MCNC: 103 MG/DL
HBA1C MFR BLD HPLC: 5.3 %
HCT VFR BLD CALC: 44.5 %
HGB BLD-MCNC: 14.8 G/DL
IMM GRANULOCYTES NFR BLD AUTO: 0.6 %
LYMPHOCYTES # BLD AUTO: 1.24 K/UL
LYMPHOCYTES NFR BLD AUTO: 23.6 %
MAN DIFF?: NORMAL
MCHC RBC-ENTMCNC: 31.4 PG
MCHC RBC-ENTMCNC: 33.3 GM/DL
MCV RBC AUTO: 94.5 FL
MONOCYTES # BLD AUTO: 0.49 K/UL
MONOCYTES NFR BLD AUTO: 9.3 %
NEUTROPHILS # BLD AUTO: 3.39 K/UL
NEUTROPHILS NFR BLD AUTO: 64.6 %
PLATELET # BLD AUTO: 139 K/UL
POTASSIUM SERPL-SCNC: 4.3 MMOL/L
PROT SERPL-MCNC: 6.6 G/DL
PSA SERPL-MCNC: 4.52 NG/ML
RBC # BLD: 4.71 M/UL
RBC # FLD: 15 %
SARS-COV-2 AB SERPL IA-ACNC: 87.8 U/ML
SARS-COV-2 AB SERPL QL IA: 0.09 INDEX
SODIUM SERPL-SCNC: 142 MMOL/L
T4 FREE SERPL-MCNC: 1.4 NG/DL
TSH SERPL-ACNC: 1.32 UIU/ML
URATE SERPL-MCNC: 4 MG/DL
WBC # FLD AUTO: 5.25 K/UL

## 2021-09-17 LAB
HCV AB SER QL: NONREACTIVE
HCV S/CO RATIO: 0.1 S/CO

## 2022-01-06 ENCOUNTER — APPOINTMENT (OUTPATIENT)
Dept: FAMILY MEDICINE | Facility: CLINIC | Age: 82
End: 2022-01-06
Payer: MEDICARE

## 2022-01-06 VITALS
BODY MASS INDEX: 26.74 KG/M2 | RESPIRATION RATE: 14 BRPM | WEIGHT: 191 LBS | OXYGEN SATURATION: 99 % | SYSTOLIC BLOOD PRESSURE: 140 MMHG | HEIGHT: 71 IN | TEMPERATURE: 97.6 F | HEART RATE: 83 BPM | DIASTOLIC BLOOD PRESSURE: 73 MMHG

## 2022-01-06 PROCEDURE — 99214 OFFICE O/P EST MOD 30 MIN: CPT

## 2022-01-06 NOTE — HEALTH RISK ASSESSMENT
[Never] : Never [Yes] : Yes [Monthly or less (1 pt)] : Monthly or less (1 point) [1 or 2 (0 pts)] : 1 or 2 (0 points) [Never (0 pts)] : Never (0 points) [Audit-CScore] : 1

## 2022-01-06 NOTE — HISTORY OF PRESENT ILLNESS
[Spouse] : spouse [FreeTextEntry1] : See HPI. [de-identified] : Patient is an 81-year-old gentleman who presents today for follow-up and disease management.  Patient states that he is in his usual state of health presently feeling well he is accompanied by his spouse.  Medical history is significant for syncopal episode which was a vasovagal episode he has not had any further episodes since his last episode states that he is feeling well has not gotten lightheaded and no dizziness he also has a history of thrombocytopenia, stage IIIa chronic renal insufficiency which is stable and history of squamous cell carcinoma of head and neck which is followed very closely by oncology at [facility] [place].  History of hyperuricemia well-controlled with just one medication allopurinol.\par \par Presently the patient is awake alert and oriented x3 in no acute distress calm and cooperative.

## 2022-01-06 NOTE — ASSESSMENT
[FreeTextEntry1] : And plan:\par \par 1.  Vasovagal episode no further episodes patient said that he has not had any episodes of dizziness or lightheadedness.\par \par 2.  At his last visit he had a mild thrombocytopenia patient is followed also at [facility] due to the fact he has had head and neck squamous cell carcinoma which was treated with radiation therapy and also chemotherapy.  Patient complains of hearing loss and also dysgeusia most likely a sequelae of radiation therapy to the head and neck.  I reviewed with patient comprehensive blood work which was done at last visit thrombocytopenia is stable.\par \par 3.  Chronic kidney disease stage IIIa stable for patient at this visit comprehensive blood work was obtained which was drawn by examiner I did discuss with both patient and spouse to avoid nephrotoxic medications and to keep himself well-hydrated at least 64 ounces of fluid daily.  The recommendations are 64 ounces of fluids daily.\par \par 4.  Hyperuricemia continue allopurinol 300 mg p.o. daily I will check uric acid levels.\par \par 5.  Patient is up-to-date with COVID-19 vaccination including booster.

## 2022-05-03 ENCOUNTER — APPOINTMENT (OUTPATIENT)
Dept: FAMILY MEDICINE | Facility: CLINIC | Age: 82
End: 2022-05-03
Payer: MEDICARE

## 2022-05-03 VITALS
RESPIRATION RATE: 16 BRPM | HEIGHT: 71 IN | TEMPERATURE: 97.2 F | WEIGHT: 193 LBS | SYSTOLIC BLOOD PRESSURE: 152 MMHG | HEART RATE: 69 BPM | OXYGEN SATURATION: 98 % | DIASTOLIC BLOOD PRESSURE: 80 MMHG | BODY MASS INDEX: 27.02 KG/M2

## 2022-05-03 DIAGNOSIS — R55 SYNCOPE AND COLLAPSE: ICD-10-CM

## 2022-05-03 PROCEDURE — 36415 COLL VENOUS BLD VENIPUNCTURE: CPT

## 2022-05-03 PROCEDURE — 99214 OFFICE O/P EST MOD 30 MIN: CPT | Mod: 25

## 2022-05-03 NOTE — HISTORY OF PRESENT ILLNESS
[Spouse] : spouse [FreeTextEntry1] : See HPI. [de-identified] : Patient is an 81-year-old gentleman who presents today for follow-up and disease management.  Patient states that he is in his usual state of health presently feeling well he is accompanied by his spouse.  Medical history is significant for syncopal episode which was a vasovagal episode he has not had any further episodes since his last episode states that he is feeling well has not gotten lightheaded and no dizziness he also has a history of thrombocytopenia, stage IIIa chronic renal insufficiency which is stable and history of squamous cell carcinoma of head and neck which is followed very closely by oncology at [facility].  History of hyperuricemia well-controlled with just one medication allopurinol.\par \par Presently the patient is awake alert and oriented x3 in no acute distress calm and cooperative.

## 2022-05-03 NOTE — ASSESSMENT
[FreeTextEntry1] : And plan:\par \par 1.  Vasovagal episode no further episodes patient said that he has not had any episodes of dizziness or lightheadedness.\par \par 2.  At his last visit he had a mild thrombocytopenia patient is followed also at [facility] due to the fact he has had head and neck squamous cell carcinoma which was treated with radiation therapy and also chemotherapy.  Patient complains of hearing loss and also dysgeusia most likely a sequelae of radiation therapy to the head and neck.  I reviewed with patient comprehensive blood work which was done at last visit thrombocytopenia is stable.\par \par 3.  Chronic kidney disease stage IIIa stable for patient at this visit comprehensive blood work was obtained which was drawn by examiner I did discuss with both patient and spouse to avoid nephrotoxic medications and to keep himself well-hydrated at least 64 ounces of fluid daily.  The recommendations are 64 ounces of fluids daily.\par \par 4.  Hyperuricemia continue allopurinol 300 mg p.o. daily I will check uric acid levels.\par \par 5.  Patient is up-to-date with COVID-19 vaccination including booster.\par \par 6. comprehensive blood work drawn in office by examiner, we will continue present medical management and patient has no history of cardiac disease therefore aspirin will be discontinued.

## 2022-05-03 NOTE — HEALTH RISK ASSESSMENT

## 2022-05-04 ENCOUNTER — NON-APPOINTMENT (OUTPATIENT)
Age: 82
End: 2022-05-04

## 2022-05-04 LAB
ALBUMIN SERPL ELPH-MCNC: 4.6 G/DL
ALP BLD-CCNC: 54 U/L
ALT SERPL-CCNC: 14 U/L
ANION GAP SERPL CALC-SCNC: 15 MMOL/L
AST SERPL-CCNC: 25 U/L
BASOPHILS # BLD AUTO: 0.03 K/UL
BASOPHILS NFR BLD AUTO: 0.4 %
BILIRUB SERPL-MCNC: 1.7 MG/DL
BUN SERPL-MCNC: 33 MG/DL
CALCIUM SERPL-MCNC: 9.9 MG/DL
CHLORIDE SERPL-SCNC: 103 MMOL/L
CHOLEST SERPL-MCNC: 181 MG/DL
CO2 SERPL-SCNC: 23 MMOL/L
CREAT SERPL-MCNC: 1.14 MG/DL
EGFR: 65 ML/MIN/1.73M2
EOSINOPHIL # BLD AUTO: 0.09 K/UL
EOSINOPHIL NFR BLD AUTO: 1.3 %
GLUCOSE SERPL-MCNC: 99 MG/DL
HCT VFR BLD CALC: 46.8 %
HDLC SERPL-MCNC: 61 MG/DL
HGB BLD-MCNC: 14.9 G/DL
IMM GRANULOCYTES NFR BLD AUTO: 0.4 %
LDLC SERPL CALC-MCNC: 105 MG/DL
LYMPHOCYTES # BLD AUTO: 1.18 K/UL
LYMPHOCYTES NFR BLD AUTO: 17.3 %
MAN DIFF?: NORMAL
MCHC RBC-ENTMCNC: 30.2 PG
MCHC RBC-ENTMCNC: 31.8 GM/DL
MCV RBC AUTO: 94.7 FL
MONOCYTES # BLD AUTO: 0.65 K/UL
MONOCYTES NFR BLD AUTO: 9.5 %
NEUTROPHILS # BLD AUTO: 4.84 K/UL
NEUTROPHILS NFR BLD AUTO: 71.1 %
NONHDLC SERPL-MCNC: 120 MG/DL
PLATELET # BLD AUTO: 151 K/UL
POTASSIUM SERPL-SCNC: 4.2 MMOL/L
PROT SERPL-MCNC: 7 G/DL
PSA SERPL-MCNC: 9.08 NG/ML
RBC # BLD: 4.94 M/UL
RBC # FLD: 14.6 %
SODIUM SERPL-SCNC: 141 MMOL/L
TRIGL SERPL-MCNC: 78 MG/DL
URATE SERPL-MCNC: 4.2 MG/DL
WBC # FLD AUTO: 6.82 K/UL

## 2022-07-25 ENCOUNTER — APPOINTMENT (OUTPATIENT)
Dept: FAMILY MEDICINE | Facility: CLINIC | Age: 82
End: 2022-07-25

## 2022-07-25 ENCOUNTER — NON-APPOINTMENT (OUTPATIENT)
Age: 82
End: 2022-07-25

## 2022-07-25 VITALS
DIASTOLIC BLOOD PRESSURE: 78 MMHG | RESPIRATION RATE: 15 BRPM | TEMPERATURE: 97.8 F | WEIGHT: 190 LBS | OXYGEN SATURATION: 99 % | HEART RATE: 94 BPM | HEIGHT: 71 IN | BODY MASS INDEX: 26.6 KG/M2 | SYSTOLIC BLOOD PRESSURE: 140 MMHG

## 2022-07-25 DIAGNOSIS — Z01.818 ENCOUNTER FOR OTHER PREPROCEDURAL EXAMINATION: ICD-10-CM

## 2022-07-25 PROCEDURE — 99214 OFFICE O/P EST MOD 30 MIN: CPT | Mod: 25

## 2022-07-25 PROCEDURE — 93000 ELECTROCARDIOGRAM COMPLETE: CPT | Mod: 59

## 2022-07-25 NOTE — ASSESSMENT
[High Risk Surgery - Intraperitoneal, Intrathoracic or Supringuinal Vascular Procedures] : High Risk Surgery - Intraperitoneal, Intrathoracic or Supringuinal Vascular Procedures - No (0) [Ischemic Heart Disease] : Ischemic Heart Disease - No (0) [Congestive Heart Failure] : Congestive Heart Failure - No (0) [Prior Cerebrovascular Accident or TIA] : Prior Cerebrovascular Accident or TIA - No (0) [Creatinine >= 2mg/dL (1 Point)] : Creatinine >= 2mg/dL - No (0) [Insulin-dependent Diabetic (1 Point)] : Insulin-dependent Diabetic - No (0) [0] : 0 , RCRI Class: I, Risk of Post-Op Cardiac Complications: 3.9%, 95% CI for Risk Estimate: 2.8% - 5.4% [Patient Optimized for Surgery] : Patient optimized for surgery [Modify anti-platelet treatment prior to procedure] : Modify anti-platelet treatment prior to procedure [Modify medications prior to procedure] : Modify medications prior to procedure [FreeTextEntry6] : Stop aspirin 1 week prior to surgery

## 2022-07-25 NOTE — RESULTS/DATA
[] : results reviewed [de-identified] : Acceptable [de-identified] : Acceptable [de-identified] : Normal sinus rhythm with PACs.  No acute ST or T wave change

## 2022-07-25 NOTE — HISTORY OF PRESENT ILLNESS
[No Pertinent Cardiac History] : no history of aortic stenosis, atrial fibrillation, coronary artery disease, recent myocardial infarction, or implantable device/pacemaker [No Pertinent Pulmonary History] : no history of asthma, COPD, sleep apnea, or smoking [No Adverse Anesthesia Reaction] : no adverse anesthesia reaction in self or family member [Chronic Kidney Disease] : chronic kidney disease [(Patient denies any chest pain, claudication, dyspnea on exertion, orthopnea, palpitations or syncope)] : Patient denies any chest pain, claudication, dyspnea on exertion, orthopnea, palpitations or syncope [Chronic Anticoagulation] : no chronic anticoagulation [Diabetes] : no diabetes [FreeTextEntry1] : Fusion biopsy  [FreeTextEntry2] : 08/10/22 [FreeTextEntry3] : Dr. Albert Robles [FreeTextEntry4] : Fabricio presents for preoperative clearance for fusion biopsy.  Patient's PSA was acutely elevated at 9.08 at patient's last follow-up with Dr. Mcwilliams in May.  No new symptoms reported.  He followed up with urologist Dr. Neptali call who performed an MRI of the prostate.  MRI indicative of multiple prostate nodules that are moderately suspicious. \par \par Overall he feels well.  Does take a baby aspirin every other day for general health.  Verbalized understanding that he will stop aspirin prior to surgery due to increased risk of bleeding.

## 2022-07-25 NOTE — PLAN
103 [FreeTextEntry1] : Revised cardiac risk index I\par Moderate risk patient being cleared for low risk procedure.\par Discussed importance of stopping aspirin prior to surgery\par No acute contraindications noted thus it is an acceptable risk for patient to have surgery performed\par ECG unchanged from prior study in 2020.  No chest pain, shortness of breath or palpitations noted.  Discussed with PCP Dr. Mcwilliams as well, who also believes is an acceptable risk for patient to have procedure performed

## 2022-08-23 ENCOUNTER — RX RENEWAL (OUTPATIENT)
Age: 82
End: 2022-08-23

## 2022-09-14 ENCOUNTER — APPOINTMENT (OUTPATIENT)
Dept: FAMILY MEDICINE | Facility: CLINIC | Age: 82
End: 2022-09-14

## 2022-09-14 VITALS
SYSTOLIC BLOOD PRESSURE: 140 MMHG | HEIGHT: 71 IN | WEIGHT: 194 LBS | DIASTOLIC BLOOD PRESSURE: 72 MMHG | HEART RATE: 70 BPM | OXYGEN SATURATION: 97 % | TEMPERATURE: 97.9 F | BODY MASS INDEX: 27.16 KG/M2 | RESPIRATION RATE: 14 BRPM

## 2022-09-14 DIAGNOSIS — B35.6 TINEA CRURIS: ICD-10-CM

## 2022-09-14 DIAGNOSIS — C76.0 MALIGNANT NEOPLASM OF HEAD, FACE AND NECK: ICD-10-CM

## 2022-09-14 DIAGNOSIS — R97.20 ELEVATED PROSTATE, SPECIFIC ANTIGEN [PSA]: ICD-10-CM

## 2022-09-14 PROCEDURE — 99214 OFFICE O/P EST MOD 30 MIN: CPT

## 2022-09-14 NOTE — HISTORY OF PRESENT ILLNESS
[Spouse] : spouse [FreeTextEntry1] : See HPI. [de-identified] : Patient is an 82-year-old gentleman who presents today for follow-up and disease management.  Patient states that he is in his usual state of health presently feeling well he is accompanied by his spouse.  Medical history is significant for syncopal episode which was a vasovagal episode he has not had any further episodes since his last episode states that he is feeling well has not gotten lightheaded and no dizziness he also has a history of thrombocytopenia, stage IIIa chronic renal insufficiency which is stable and history of squamous cell carcinoma of head and neck which is followed very closely by oncology at [facility] [place].  History of hyperuricemia well-controlled with just one medication allopurinol.\par \par Patient was recently diagnosed with prostate adenocarcinoma status post biopsy he will be treated at [facility] [place].\par \par Presently the patient is awake alert and oriented x3 in no acute distress calm and cooperative.

## 2022-09-14 NOTE — ASSESSMENT
[FreeTextEntry1] : Assessment and plan:\par \par 1.  Vasovagal episode no further episodes patient said that he has not had any episodes of dizziness or lightheadedness.\par \par 2.  Prostate cancer patient has been evaluated at [facility] and will be treated at [facility] [place].\par \par 3.  Chronic kidney disease stage IIIa stable for patient at this visit comprehensive blood work was obtained which was drawn by examiner I did discuss with both patient and spouse to avoid nephrotoxic medications and to keep himself well-hydrated at least 64 ounces of fluid daily.  The recommendations are 64 ounces of fluids daily.\par \par 4.  Hyperuricemia continue allopurinol 300 mg p.o. daily recent uric acid levels were within normal limits.\par \par 5. comprehensive blood work drawn reviewed with patient.

## 2022-10-24 DIAGNOSIS — Z12.11 ENCOUNTER FOR SCREENING FOR MALIGNANT NEOPLASM OF COLON: ICD-10-CM

## 2022-10-28 LAB — HEMOCCULT STL QL IA: POSITIVE

## 2022-11-02 ENCOUNTER — APPOINTMENT (OUTPATIENT)
Dept: GASTROENTEROLOGY | Facility: CLINIC | Age: 82
End: 2022-11-02

## 2022-11-02 VITALS
SYSTOLIC BLOOD PRESSURE: 173 MMHG | WEIGHT: 185 LBS | DIASTOLIC BLOOD PRESSURE: 90 MMHG | HEART RATE: 83 BPM | BODY MASS INDEX: 25.9 KG/M2 | HEIGHT: 71 IN

## 2022-11-02 DIAGNOSIS — R19.5 OTHER FECAL ABNORMALITIES: ICD-10-CM

## 2022-11-02 PROCEDURE — 99203 OFFICE O/P NEW LOW 30 MIN: CPT

## 2022-11-03 PROBLEM — R19.5 OCCULT BLOOD IN STOOLS: Status: ACTIVE | Noted: 2022-11-03

## 2022-11-03 NOTE — REASON FOR VISIT
[Consultation] : a consultation visit [Family Member] : family member [FreeTextEntry1] : occult blood in stool

## 2022-11-03 NOTE — PHYSICAL EXAM

## 2022-11-03 NOTE — HISTORY OF PRESENT ILLNESS
[FreeTextEntry1] : Mr. JODI GIFFORD is a 82 year old male with history of prostate cancer and history of head and neck cancer. Patient never had prior screening. No complaints of abdominal discomfort, bowel problems, rectal bleeding. No family history of colon cancer. Patient had routine iFOB testing before starting treatment for prostate cancer - noted to be trace positive.\par

## 2022-11-03 NOTE — ASSESSMENT
[FreeTextEntry1] : 83 yo male with history of trace guiaic positive stool. Patient needs colonoscopy prior to starting\par radiation for prostate cancer. Will arrange at [facility].

## 2022-11-16 DIAGNOSIS — U07.1 COVID-19: ICD-10-CM

## 2022-12-07 ENCOUNTER — RESULT REVIEW (OUTPATIENT)
Age: 82
End: 2022-12-07

## 2022-12-07 ENCOUNTER — OUTPATIENT (OUTPATIENT)
Dept: OUTPATIENT SERVICES | Facility: HOSPITAL | Age: 82
LOS: 1 days | Discharge: HOME | End: 2022-12-07
Payer: MEDICARE

## 2022-12-07 ENCOUNTER — APPOINTMENT (OUTPATIENT)
Dept: GASTROENTEROLOGY | Facility: HOSPITAL | Age: 82
End: 2022-12-07

## 2022-12-07 VITALS
HEIGHT: 73 IN | WEIGHT: 184.97 LBS | OXYGEN SATURATION: 100 % | HEART RATE: 78 BPM | RESPIRATION RATE: 18 BRPM | TEMPERATURE: 97 F | DIASTOLIC BLOOD PRESSURE: 74 MMHG | SYSTOLIC BLOOD PRESSURE: 146 MMHG

## 2022-12-07 DIAGNOSIS — R19.5 OTHER FECAL ABNORMALITIES: ICD-10-CM

## 2022-12-07 DIAGNOSIS — Z98.890 OTHER SPECIFIED POSTPROCEDURAL STATES: Chronic | ICD-10-CM

## 2022-12-07 PROCEDURE — 88305 TISSUE EXAM BY PATHOLOGIST: CPT

## 2022-12-07 PROCEDURE — 88305 TISSUE EXAM BY PATHOLOGIST: CPT | Mod: 26

## 2022-12-07 PROCEDURE — 45380 COLONOSCOPY AND BIOPSY: CPT

## 2022-12-07 NOTE — ASU PATIENT PROFILE, ADULT - NSICDXPASTMEDICALHX_GEN_ALL_CORE_FT
PAST MEDICAL HISTORY:  BCC (basal cell carcinoma)     Gout     Prostate CA     SCC (squamous cell carcinoma) of head and neck

## 2022-12-07 NOTE — ASU PATIENT PROFILE, ADULT - FALL HARM RISK - UNIVERSAL INTERVENTIONS
Bed in lowest position, wheels locked, appropriate side rails in place/Call bell, personal items and telephone in reach/Instruct patient to call for assistance before getting out of bed or chair/Non-slip footwear when patient is out of bed/[place] to call system/Physically safe environment - no spills, clutter or unnecessary equipment/Purposeful Proactive Rounding/Room/bathroom lighting operational, light cord in reach

## 2022-12-07 NOTE — ASU PREOP CHECKLIST - HAND OFF
Unit RN to OR RN Double O-Z Flap Text: The defect edges were debeveled with a #15 scalpel blade.  Given the location of the defect, shape of the defect and the proximity to free margins a Double O-Z flap was deemed most appropriate.  Using a sterile surgical marker, an appropriate transposition flap was drawn incorporating the defect and placing the expected incisions within the relaxed skin tension lines where possible. The area thus outlined was incised deep to adipose tissue with a #15 scalpel blade.  The skin margins were undermined to an appropriate distance in all directions utilizing iris scissors.

## 2022-12-11 DIAGNOSIS — Z85.46 PERSONAL HISTORY OF MALIGNANT NEOPLASM OF PROSTATE: ICD-10-CM

## 2022-12-11 DIAGNOSIS — K57.30 DIVERTICULOSIS OF LARGE INTESTINE WITHOUT PERFORATION OR ABSCESS WITHOUT BLEEDING: ICD-10-CM

## 2022-12-11 DIAGNOSIS — Z79.82 LONG TERM (CURRENT) USE OF ASPIRIN: ICD-10-CM

## 2022-12-11 DIAGNOSIS — N18.9 CHRONIC KIDNEY DISEASE, UNSPECIFIED: ICD-10-CM

## 2022-12-11 DIAGNOSIS — H91.90 UNSPECIFIED HEARING LOSS, UNSPECIFIED EAR: ICD-10-CM

## 2022-12-11 DIAGNOSIS — Z85.831 PERSONAL HISTORY OF MALIGNANT NEOPLASM OF SOFT TISSUE: ICD-10-CM

## 2022-12-11 DIAGNOSIS — Z88.0 ALLERGY STATUS TO PENICILLIN: ICD-10-CM

## 2022-12-11 DIAGNOSIS — Z87.442 PERSONAL HISTORY OF URINARY CALCULI: ICD-10-CM

## 2022-12-11 DIAGNOSIS — Z86.73 PERSONAL HISTORY OF TRANSIENT ISCHEMIC ATTACK (TIA), AND CEREBRAL INFARCTION WITHOUT RESIDUAL DEFICITS: ICD-10-CM

## 2022-12-11 DIAGNOSIS — D12.8 BENIGN NEOPLASM OF RECTUM: ICD-10-CM

## 2022-12-11 DIAGNOSIS — M10.9 GOUT, UNSPECIFIED: ICD-10-CM

## 2022-12-11 DIAGNOSIS — R19.5 OTHER FECAL ABNORMALITIES: ICD-10-CM

## 2022-12-11 DIAGNOSIS — K64.9 UNSPECIFIED HEMORRHOIDS: ICD-10-CM

## 2022-12-11 DIAGNOSIS — Z88.1 ALLERGY STATUS TO OTHER ANTIBIOTIC AGENTS STATUS: ICD-10-CM

## 2022-12-11 DIAGNOSIS — N40.0 BENIGN PROSTATIC HYPERPLASIA WITHOUT LOWER URINARY TRACT SYMPTOMS: ICD-10-CM

## 2022-12-13 PROBLEM — C61 MALIGNANT NEOPLASM OF PROSTATE: Chronic | Status: ACTIVE | Noted: 2022-12-07

## 2023-01-09 ENCOUNTER — OUTPATIENT (OUTPATIENT)
Dept: OUTPATIENT SERVICES | Facility: HOSPITAL | Age: 83
LOS: 1 days | End: 2023-01-09
Payer: MEDICARE

## 2023-01-09 VITALS
SYSTOLIC BLOOD PRESSURE: 150 MMHG | HEIGHT: 70 IN | TEMPERATURE: 97 F | RESPIRATION RATE: 14 BRPM | WEIGHT: 186.95 LBS | DIASTOLIC BLOOD PRESSURE: 61 MMHG | OXYGEN SATURATION: 99 % | HEART RATE: 80 BPM

## 2023-01-09 DIAGNOSIS — K63.5 POLYP OF COLON: ICD-10-CM

## 2023-01-09 DIAGNOSIS — K26.1 ACUTE DUODENAL ULCER WITH PERFORATION: ICD-10-CM

## 2023-01-09 DIAGNOSIS — Z98.890 OTHER SPECIFIED POSTPROCEDURAL STATES: Chronic | ICD-10-CM

## 2023-01-09 LAB
ANION GAP SERPL CALC-SCNC: 7 MMOL/L — SIGNIFICANT CHANGE UP (ref 5–17)
APTT BLD: 29.1 SEC — SIGNIFICANT CHANGE UP (ref 27.5–35.5)
BASOPHILS # BLD AUTO: 0.02 K/UL — SIGNIFICANT CHANGE UP (ref 0–0.2)
BASOPHILS NFR BLD AUTO: 0.2 % — SIGNIFICANT CHANGE UP (ref 0–2)
BUN SERPL-MCNC: 41 MG/DL — HIGH (ref 7–23)
CALCIUM SERPL-MCNC: 9.8 MG/DL — SIGNIFICANT CHANGE UP (ref 8.5–10.1)
CHLORIDE SERPL-SCNC: 107 MMOL/L — SIGNIFICANT CHANGE UP (ref 96–108)
CO2 SERPL-SCNC: 27 MMOL/L — SIGNIFICANT CHANGE UP (ref 22–31)
CREAT SERPL-MCNC: 1.34 MG/DL — HIGH (ref 0.5–1.3)
EGFR: 53 ML/MIN/1.73M2 — LOW
EOSINOPHIL # BLD AUTO: 0.06 K/UL — SIGNIFICANT CHANGE UP (ref 0–0.5)
EOSINOPHIL NFR BLD AUTO: 0.7 % — SIGNIFICANT CHANGE UP (ref 0–6)
GLUCOSE SERPL-MCNC: 115 MG/DL — HIGH (ref 70–99)
HCT VFR BLD CALC: 41.5 % — SIGNIFICANT CHANGE UP (ref 39–50)
HGB BLD-MCNC: 13.6 G/DL — SIGNIFICANT CHANGE UP (ref 13–17)
IMM GRANULOCYTES NFR BLD AUTO: 0.4 % — SIGNIFICANT CHANGE UP (ref 0–0.9)
INR BLD: 1.09 RATIO — SIGNIFICANT CHANGE UP (ref 0.88–1.16)
LYMPHOCYTES # BLD AUTO: 1.38 K/UL — SIGNIFICANT CHANGE UP (ref 1–3.3)
LYMPHOCYTES # BLD AUTO: 16.4 % — SIGNIFICANT CHANGE UP (ref 13–44)
MCHC RBC-ENTMCNC: 30.4 PG — SIGNIFICANT CHANGE UP (ref 27–34)
MCHC RBC-ENTMCNC: 32.8 GM/DL — SIGNIFICANT CHANGE UP (ref 32–36)
MCV RBC AUTO: 92.8 FL — SIGNIFICANT CHANGE UP (ref 80–100)
MONOCYTES # BLD AUTO: 0.75 K/UL — SIGNIFICANT CHANGE UP (ref 0–0.9)
MONOCYTES NFR BLD AUTO: 8.9 % — SIGNIFICANT CHANGE UP (ref 2–14)
NEUTROPHILS # BLD AUTO: 6.19 K/UL — SIGNIFICANT CHANGE UP (ref 1.8–7.4)
NEUTROPHILS NFR BLD AUTO: 73.4 % — SIGNIFICANT CHANGE UP (ref 43–77)
PLATELET # BLD AUTO: 137 K/UL — LOW (ref 150–400)
POTASSIUM SERPL-MCNC: 3.4 MMOL/L — LOW (ref 3.5–5.3)
POTASSIUM SERPL-SCNC: 3.4 MMOL/L — LOW (ref 3.5–5.3)
PROTHROM AB SERPL-ACNC: 12.7 SEC — SIGNIFICANT CHANGE UP (ref 10.5–13.4)
RBC # BLD: 4.47 M/UL — SIGNIFICANT CHANGE UP (ref 4.2–5.8)
RBC # FLD: 14.7 % — HIGH (ref 10.3–14.5)
SARS-COV-2 RNA SPEC QL NAA+PROBE: SIGNIFICANT CHANGE UP
SODIUM SERPL-SCNC: 141 MMOL/L — SIGNIFICANT CHANGE UP (ref 135–145)
WBC # BLD: 8.43 K/UL — SIGNIFICANT CHANGE UP (ref 3.8–10.5)
WBC # FLD AUTO: 8.43 K/UL — SIGNIFICANT CHANGE UP (ref 3.8–10.5)

## 2023-01-09 PROCEDURE — 85025 COMPLETE CBC W/AUTO DIFF WBC: CPT

## 2023-01-09 PROCEDURE — 93005 ELECTROCARDIOGRAM TRACING: CPT

## 2023-01-09 PROCEDURE — 80048 BASIC METABOLIC PNL TOTAL CA: CPT

## 2023-01-09 PROCEDURE — U0005: CPT

## 2023-01-09 PROCEDURE — 85610 PROTHROMBIN TIME: CPT

## 2023-01-09 PROCEDURE — 93010 ELECTROCARDIOGRAM REPORT: CPT

## 2023-01-09 PROCEDURE — 36415 COLL VENOUS BLD VENIPUNCTURE: CPT

## 2023-01-09 PROCEDURE — 85730 THROMBOPLASTIN TIME PARTIAL: CPT

## 2023-01-09 PROCEDURE — U0003: CPT

## 2023-01-09 RX ORDER — ASPIRIN/CALCIUM CARB/MAGNESIUM 324 MG
0 TABLET ORAL
Qty: 0 | Refills: 0 | DISCHARGE

## 2023-01-09 NOTE — H&P PST ADULT - HISTORY OF PRESENT ILLNESS
83y/o male scheduled for sigmoidoscopy on 1/10/2023.  Pt states, "2 weeks ago underwent colonoscopy, identified a polyp.  Diagnosed with prostate cancer 5/2022. " 81y/o male scheduled for sigmoidoscopy on 1/10/2023.  Pt states, "2 weeks ago underwent colonoscopy, identified a polyp.  Diagnosed with prostate cancer 5/2022, being tx with casodex, lupron, tamosulosin. "

## 2023-01-09 NOTE — H&P PST ADULT - PROBLEM SELECTOR PLAN 1
Pt scheduled for flexible sigmoidoscopy on 1/10/2023.  labs done results pending, ekg done. Preop covid testing done.  Pt instructed to follow instructions given by endoscopy, will receive a phone call today.  anesthesia - JASMIN Precautions

## 2023-01-09 NOTE — H&P PST ADULT - GASTROINTESTINAL
details… normal/soft/nontender/nondistended/normal active bowel sounds/no guarding/no rigidity/no organomegaly/no palpable rima/no masses palpable

## 2023-01-09 NOTE — H&P PST ADULT - NEGATIVE ENMT SYMPTOMS
no ear pain/no tinnitus/no vertigo/no sinus symptoms/no nasal congestion/no nasal discharge/no nasal obstruction/no post-nasal discharge/no nose bleeds/no recurrent cold sores/no abnormal taste sensation/no dry mouth/no throat pain/no dysphagia

## 2023-01-09 NOTE — H&P PST ADULT - NSICDXPASTMEDICALHX_GEN_ALL_CORE_FT
PAST MEDICAL HISTORY:  BCC (basal cell carcinoma)     Colon polyp     Gout     Prostate CA     S/P radiation therapy     SCC (squamous cell carcinoma) of head and neck    Status post chemotherapy

## 2023-01-09 NOTE — H&P PST ADULT - RESPIRATORY
normal/clear to auscultation bilaterally/no wheezes/no rales/no rhonchi/no respiratory distress/no use of accessory muscles/no subcutaneous emphysema/airway patent/breath sounds equal/good air movement/respirations non-labored

## 2023-01-10 ENCOUNTER — OUTPATIENT (OUTPATIENT)
Dept: OUTPATIENT SERVICES | Facility: HOSPITAL | Age: 83
LOS: 1 days | Discharge: HOME | End: 2023-01-10
Payer: MEDICARE

## 2023-01-10 ENCOUNTER — APPOINTMENT (OUTPATIENT)
Dept: GASTROENTEROLOGY | Facility: HOSPITAL | Age: 83
End: 2023-01-10
Payer: MEDICARE

## 2023-01-10 ENCOUNTER — RESULT REVIEW (OUTPATIENT)
Age: 83
End: 2023-01-10

## 2023-01-10 VITALS
TEMPERATURE: 98 F | OXYGEN SATURATION: 100 % | DIASTOLIC BLOOD PRESSURE: 82 MMHG | WEIGHT: 184.97 LBS | RESPIRATION RATE: 18 BRPM | HEART RATE: 77 BPM | SYSTOLIC BLOOD PRESSURE: 166 MMHG

## 2023-01-10 DIAGNOSIS — Z98.890 OTHER SPECIFIED POSTPROCEDURAL STATES: Chronic | ICD-10-CM

## 2023-01-10 DIAGNOSIS — K26.1 ACUTE DUODENAL ULCER WITH PERFORATION: ICD-10-CM

## 2023-01-10 PROCEDURE — C1889: CPT

## 2023-01-10 PROCEDURE — 45349 SIGMOIDOSCOPY W/RESECTION: CPT

## 2023-01-10 PROCEDURE — 88305 TISSUE EXAM BY PATHOLOGIST: CPT | Mod: 26

## 2023-01-10 PROCEDURE — 88305 TISSUE EXAM BY PATHOLOGIST: CPT

## 2023-01-10 NOTE — ASU PATIENT PROFILE, ADULT - FALL HARM RISK - UNIVERSAL INTERVENTIONS
Bed in lowest position, wheels locked, appropriate side rails in place/Call bell, personal items and telephone in reach/Instruct patient to call for assistance before getting out of bed or chair/Non-slip footwear when patient is out of bed/Siler to call system/Physically safe environment - no spills, clutter or unnecessary equipment/Purposeful Proactive Rounding/Room/bathroom lighting operational, light cord in reach

## 2023-01-13 LAB — SURGICAL PATHOLOGY STUDY: SIGNIFICANT CHANGE UP

## 2023-01-16 DIAGNOSIS — Z91.041 RADIOGRAPHIC DYE ALLERGY STATUS: ICD-10-CM

## 2023-01-16 DIAGNOSIS — Z88.0 ALLERGY STATUS TO PENICILLIN: ICD-10-CM

## 2023-01-16 DIAGNOSIS — Z85.828 PERSONAL HISTORY OF OTHER MALIGNANT NEOPLASM OF SKIN: ICD-10-CM

## 2023-01-16 DIAGNOSIS — Z79.82 LONG TERM (CURRENT) USE OF ASPIRIN: ICD-10-CM

## 2023-01-16 DIAGNOSIS — Z86.010 PERSONAL HISTORY OF COLONIC POLYPS: ICD-10-CM

## 2023-01-16 DIAGNOSIS — Z85.46 PERSONAL HISTORY OF MALIGNANT NEOPLASM OF PROSTATE: ICD-10-CM

## 2023-01-16 DIAGNOSIS — D12.8 BENIGN NEOPLASM OF RECTUM: ICD-10-CM

## 2023-01-16 DIAGNOSIS — Z88.1 ALLERGY STATUS TO OTHER ANTIBIOTIC AGENTS STATUS: ICD-10-CM

## 2023-01-16 DIAGNOSIS — Z92.3 PERSONAL HISTORY OF IRRADIATION: ICD-10-CM

## 2023-01-16 DIAGNOSIS — M10.9 GOUT, UNSPECIFIED: ICD-10-CM

## 2023-01-16 DIAGNOSIS — Z92.21 PERSONAL HISTORY OF ANTINEOPLASTIC CHEMOTHERAPY: ICD-10-CM

## 2023-01-20 PROBLEM — Z92.3 PERSONAL HISTORY OF IRRADIATION: Chronic | Status: ACTIVE | Noted: 2023-01-09

## 2023-01-20 PROBLEM — K63.5 POLYP OF COLON: Chronic | Status: ACTIVE | Noted: 2023-01-09

## 2023-01-20 PROBLEM — Z92.21 PERSONAL HISTORY OF ANTINEOPLASTIC CHEMOTHERAPY: Chronic | Status: ACTIVE | Noted: 2023-01-09

## 2023-02-14 ENCOUNTER — OUTPATIENT (OUTPATIENT)
Dept: INPATIENT UNIT | Facility: HOSPITAL | Age: 83
LOS: 1 days | Discharge: HOME | End: 2023-02-14
Payer: MEDICARE

## 2023-02-14 ENCOUNTER — APPOINTMENT (OUTPATIENT)
Dept: GASTROENTEROLOGY | Facility: HOSPITAL | Age: 83
End: 2023-02-14
Payer: MEDICARE

## 2023-02-14 ENCOUNTER — RESULT REVIEW (OUTPATIENT)
Age: 83
End: 2023-02-14

## 2023-02-14 ENCOUNTER — TRANSCRIPTION ENCOUNTER (OUTPATIENT)
Age: 83
End: 2023-02-14

## 2023-02-14 VITALS
RESPIRATION RATE: 16 BRPM | HEART RATE: 74 BPM | SYSTOLIC BLOOD PRESSURE: 172 MMHG | TEMPERATURE: 98 F | OXYGEN SATURATION: 100 % | DIASTOLIC BLOOD PRESSURE: 88 MMHG | HEIGHT: 70 IN | WEIGHT: 184.97 LBS

## 2023-02-14 VITALS
HEART RATE: 65 BPM | SYSTOLIC BLOOD PRESSURE: 142 MMHG | DIASTOLIC BLOOD PRESSURE: 80 MMHG | RESPIRATION RATE: 13 BRPM | OXYGEN SATURATION: 100 % | TEMPERATURE: 98 F

## 2023-02-14 DIAGNOSIS — Z98.890 OTHER SPECIFIED POSTPROCEDURAL STATES: Chronic | ICD-10-CM

## 2023-02-14 DIAGNOSIS — K62.1 RECTAL POLYP: ICD-10-CM

## 2023-02-14 PROCEDURE — 88305 TISSUE EXAM BY PATHOLOGIST: CPT | Mod: 26

## 2023-02-14 PROCEDURE — 88305 TISSUE EXAM BY PATHOLOGIST: CPT

## 2023-02-14 PROCEDURE — 45332 SIGMOIDOSCOPY W/FB REMOVAL: CPT | Mod: GC

## 2023-02-14 RX ORDER — ASPIRIN/CALCIUM CARB/MAGNESIUM 324 MG
1 TABLET ORAL
Qty: 0 | Refills: 0 | DISCHARGE

## 2023-02-14 RX ORDER — ONDANSETRON 8 MG/1
4 TABLET, FILM COATED ORAL ONCE
Refills: 0 | Status: DISCONTINUED | OUTPATIENT
Start: 2023-02-14 | End: 2023-02-14

## 2023-02-14 RX ORDER — OXYCODONE HYDROCHLORIDE 5 MG/1
5 TABLET ORAL ONCE
Refills: 0 | Status: DISCONTINUED | OUTPATIENT
Start: 2023-02-14 | End: 2023-02-14

## 2023-02-14 RX ORDER — SODIUM CHLORIDE 9 MG/ML
1000 INJECTION, SOLUTION INTRAVENOUS
Refills: 0 | Status: DISCONTINUED | OUTPATIENT
Start: 2023-02-14 | End: 2023-02-14

## 2023-02-14 RX ORDER — FENTANYL CITRATE 50 UG/ML
50 INJECTION INTRAVENOUS
Refills: 0 | Status: DISCONTINUED | OUTPATIENT
Start: 2023-02-14 | End: 2023-02-14

## 2023-02-14 NOTE — ASU DISCHARGE PLAN (ADULT/PEDIATRIC) - NS MD DC FALL RISK RISK
For information on Fall & Injury Prevention, visit: [email or web address] OR  [email or web address] OR  https://www.cdc.gov/steadi/patient.html

## 2023-02-14 NOTE — ASU PATIENT PROFILE, ADULT - HISTORY OF COVID-19 VACCINATION
Assessment/Plan:    Overweight (BMI 25 0-29  9)  BMI Counseling: Body mass index is 26 38 kg/m²  The BMI is above normal  Nutrition recommendations include reducing portion sizes, decreasing overall calorie intake, 3-5 servings of fruits/vegetables daily, reducing fast food intake, consuming healthier snacks and decreasing soda and/or juice intake  Exercise recommendations include moderate aerobic physical activity for 150 minutes/week  Diagnoses and all orders for this visit:    Left foot pain  -     Ambulatory referral to Podiatry; Future    Encounter for screening for HIV  -     HIV 1/2 Antigen/Antibody (4th Generation) w Reflex [facility]; Future    Encounter for hepatitis C screening test for low risk patient  -     Hepatitis C antibody; Future    Screening for diabetes mellitus (DM)  -     Comprehensive metabolic panel; Future    Screening for hyperlipidemia  -     Lipid panel; Future    Overweight (BMI 25 0-29 9)  -     CBC and differential; Future  -     Comprehensive metabolic panel; Future  -     Lipid panel; Future  -     TSH, 3rd generation with Free T4 reflex; Future        Use supportive foot wear  Elevate foot when seated  Ice 15 minutes BID   Subjective:      Patient ID: Nicolas Gandhi is a 32 y o  female  31 yo  female, new patient to the office, states has string family history of DM  States had foot injury at work on 10/31/2020, released back to work in December, complains of L foot pain as the day goes by, pain is located on her ankle, radiates towards her heel, described as sharp 6-7/10 at its worse  Worse with standing, walking more than 1 hour  States her ankle is swollen by the end of the work day  The following portions of the patient's history were reviewed and updated as appropriate:   She  has no past medical history on file    She   Patient Active Problem List    Diagnosis Date Noted    Left foot pain 06/01/2021    Overweight (BMI 25 0-29 9) 06/01/2021     She has no past surgical history on file  Her family history includes No Known Problems in her brother, father, mother, and sister  She  reports that she has never smoked  She has never used smokeless tobacco  She reports that she does not drink alcohol or use drugs  No current outpatient medications on file  No current facility-administered medications for this visit  No current outpatient medications on file prior to visit  No current facility-administered medications on file prior to visit       Review of Systems   Musculoskeletal: Positive for arthralgias  All other systems reviewed and are negative  Objective:      /68 (BP Location: Left arm, Patient Position: Sitting, Cuff Size: Standard)   Pulse 82   Temp (!) 97 3 °F (36 3 °C) (Temporal)   Resp 18   Ht 5' 3" (1 6 m)   Wt 67 5 kg (148 lb 14 4 oz)   LMP 05/29/2021 (Exact Date) Comment: currently on it   SpO2 98%   BMI 26 38 kg/m²          Physical Exam  Vitals signs and nursing note reviewed  Constitutional:       Appearance: She is well-developed  HENT:      Head: Normocephalic  Right Ear: External ear normal       Left Ear: External ear normal       Nose: Nose normal    Eyes:      Conjunctiva/sclera: Conjunctivae normal       Pupils: Pupils are equal, round, and reactive to light  Neck:      Musculoskeletal: Normal range of motion and neck supple  Thyroid: No thyromegaly  Cardiovascular:      Rate and Rhythm: Normal rate and regular rhythm  Heart sounds: Normal heart sounds  Pulmonary:      Effort: Pulmonary effort is normal       Breath sounds: Normal breath sounds  Abdominal:      Palpations: Abdomen is soft  Tenderness: There is no abdominal tenderness  There is no guarding or rebound  Musculoskeletal:         General: Tenderness present  Right shoulder: She exhibits tenderness  Left foot: Decreased range of motion  Tenderness present  Skin:     General: Skin is dry  Neurological:      Mental Status: She is alert and oriented to person, place, and time  Deep Tendon Reflexes: Reflexes are normal and symmetric  Yes

## 2023-02-14 NOTE — ASU PREOP CHECKLIST - TEMPERATURE IN FAHRENHEIT (DEGREES F)
Back and Spine New Patient    Patient ID: Yovani Malik is a 33 y.o. male.    Chief Complaint   Patient presents with    Back Pain     He has had mid back pain for years and it has been getting worse in the past year.       Review of Systems   Constitutional: Negative for activity change, chills, fatigue and unexpected weight change.   HENT: Negative for hearing loss, tinnitus, trouble swallowing and voice change.    Eyes: Negative for visual disturbance.   Respiratory: Negative for apnea, chest tightness and shortness of breath.    Cardiovascular: Negative for chest pain and palpitations.   Gastrointestinal: Negative for abdominal pain, constipation, diarrhea, nausea and vomiting.   Genitourinary: Negative for difficulty urinating, dysuria and frequency.   Musculoskeletal: Positive for arthralgias and back pain. Negative for gait problem, neck pain and neck stiffness.   Skin: Negative for wound.   Neurological: Negative for dizziness, tremors, seizures, facial asymmetry, speech difficulty, weakness, light-headedness, numbness and headaches.   Psychiatric/Behavioral: Negative for confusion and decreased concentration.       Past Medical History:   Diagnosis Date    Anxiety     Borderline diabetes     Hyperlipidemia     Hypertension      Social History     Socioeconomic History    Marital status:    Tobacco Use    Smoking status: Current Some Day Smoker     Packs/day: 0.50     Types: Cigarettes    Smokeless tobacco: Never Used    Tobacco comment: maybe 0.5 pack a week   Substance and Sexual Activity    Alcohol use: No    Drug use: No    Sexual activity: Yes     Partners: Female     Family History   Problem Relation Age of Onset    Diabetes Mother     Hypertension Mother     Hyperlipidemia Mother     Diabetes Father     Diabetes Maternal Grandmother     Cancer Maternal Grandmother         breast     Review of patient's allergies indicates:  No Known Allergies    Current Outpatient Medications:  "    ALPRAZolam (XANAX) 0.25 MG tablet, , Disp: , Rfl: 1    atorvastatin (LIPITOR) 10 MG tablet, Take 1 tablet (10 mg total) by mouth every evening., Disp: 90 tablet, Rfl: 3    celecoxib (CELEBREX) 200 MG capsule, TAKE 1 CAPSULE(200 MG) BY MOUTH TWICE DAILY, Disp: 60 capsule, Rfl: 2    dextroamphetamine-amphetamine (ADDERALL XR) 30 MG 24 hr capsule, TK TWO CAPSULES PO D IN THE EARLY MORNING, Disp: , Rfl: 0    HYDROcodone-acetaminophen (NORCO)  mg per tablet, Take 1 tablet by mouth every 8 (eight) hours as needed., Disp: , Rfl:     esomeprazole (NEXIUM) 40 MG capsule, Take 1 capsule (40 mg total) by mouth before breakfast. (Patient not taking: No sig reported), Disp: 30 capsule, Rfl: 11    Vitals:    01/06/22 0933   BP: (!) 136/97   BP Location: Right arm   Patient Position: Sitting   BP Method: Large (Automatic)   Pulse: 90   Weight: 113.8 kg (250 lb 14.1 oz)   Height: 5' 6" (1.676 m)       Physical Exam  Vitals and nursing note reviewed.   Constitutional:       Appearance: He is well-developed and well-nourished.   HENT:      Head: Normocephalic and atraumatic.   Eyes:      Pupils: Pupils are equal, round, and reactive to light.   Cardiovascular:      Rate and Rhythm: Normal rate.   Pulmonary:      Effort: Pulmonary effort is normal.   Abdominal:      General: There is no distension.   Musculoskeletal:         General: No edema. Normal range of motion.      Cervical back: Normal range of motion and neck supple.   Skin:     General: Skin is warm and dry.   Neurological:      Mental Status: He is alert and oriented to person, place, and time.      Coordination: Finger-Nose-Finger Test, Heel to Shin Test and Romberg Test normal.      Gait: Gait is intact. Tandem walk normal.      Deep Tendon Reflexes:      Reflex Scores:       Tricep reflexes are 1+ on the right side and 1+ on the left side.       Bicep reflexes are 1+ on the right side and 1+ on the left side.       Brachioradialis reflexes are 1+ on the " right side and 1+ on the left side.       Patellar reflexes are 1+ on the right side and 1+ on the left side.       Achilles reflexes are 1+ on the right side and 1+ on the left side.  Psychiatric:         Mood and Affect: Mood and affect normal.         Speech: Speech normal.         Behavior: Behavior normal.         Thought Content: Thought content normal.         Judgment: Judgment normal.         Neurologic Exam     Mental Status   Oriented to person, place, and time.   Oriented to person.   Oriented to place.   Oriented to time.   Follows 3 step commands.   Attention: normal. Concentration: normal.   Speech: speech is normal   Level of consciousness: alert  Knowledge: consistent with education.   Able to name object. Able to read. Able to repeat. Able to write. Normal comprehension.     Cranial Nerves     CN II   Visual acuity: normal  Right visual field deficit: none  Left visual field deficit: none     CN III, IV, VI   Pupils are equal, round, and reactive to light.  Right pupil: Size: 3 mm. Shape: regular. Reactivity: brisk. Consensual response: intact.   Left pupil: Size: 3 mm. Shape: regular. Reactivity: brisk. Consensual response: intact.   CN III: no CN III palsy  CN VI: no CN VI palsy  Nystagmus: none   Diplopia: none  Ophthalmoparesis: none  Conjugate gaze: present    CN V   Right facial sensation deficit: none  Left facial sensation deficit: none    CN VII   Right facial weakness: none  Left facial weakness: none    CN VIII   Hearing: intact    CN IX, X   CN IX normal.   CN X normal.     CN XI   Right sternocleidomastoid strength: normal  Left sternocleidomastoid strength: normal  Right trapezius strength: normal  Left trapezius strength: normal    CN XII   Fasciculations: absent  Tongue deviation: none    Motor Exam   Muscle bulk: normal  Overall muscle tone: normal  Right arm pronator drift: absent  Left arm pronator drift: absent    Strength   Right neck flexion: 5/5  Left neck flexion: 5/5  Right  neck extension: 5/5  Left neck extension: 5/5  Right deltoid: 5/5  Left deltoid: 5/5  Right biceps: 5/5  Left biceps: 5  Right triceps: 5  Left triceps: 5/  Right wrist flexion: 5/  Left wrist flexion: 5/5  Right wrist extension: 5/  Left wrist extension:   Right interossei: 5/  Left interossei: 5/  Right abdominals: 5/  Left abdominals: 5/  Right iliopsoas:   Left iliopsoas: 5  Right quadriceps: 5/  Left quadriceps: 5  Right hamstrin/5  Left hamstrin/5  Right glutei:   Left glutei:   Right anterior tibial:   Left anterior tibial:   Right posterior tibial:   Left posterior tibial:   Right peroneal:   Left peroneal:   Right gastroc:   Left gastroc:     Sensory Exam   Right arm light touch: normal  Left arm light touch: normal  Right leg light touch: normal  Left leg light touch: normal  Right arm vibration: normal  Left arm vibration: normal  Right arm pinprick: normal  Left arm pinprick: normal    Gait, Coordination, and Reflexes     Gait  Gait: normal    Coordination   Romberg: negative  Finger to nose coordination: normal  Heel to shin coordination: normal  Tandem walking coordination: normal    Tremor   Resting tremor: absent  Intention tremor: absent  Action tremor: absent    Reflexes   Right brachioradialis: 1+  Left brachioradialis: 1+  Right biceps: 1+  Left biceps: 1+  Right triceps: 1+  Left triceps: 1+  Right patellar: 1+  Left patellar: 1+  Right achilles: 1+  Left achilles: 1+  Right : 1+  Left : 1+  Right Morse: absent  Left Morse: absent  Right ankle clonus: absent  Left ankle clonus: absent      Provider dictation:    33 year old male smoker with history of multiple knee surgeries, anxiety, hypertension, hyperlipidemia, GERD presents to discuss back pain.  He has years of back pain that has worsened after last knee surgery 2020.  He has pain just inferior to the left scapular region.  Also has pain in the upper /mid  lumbar region.  He is not sure if pain radiates to the legs as he has chronic right leg pain that he has associated to knee problems in the past.  She has taken medication, gone to chiropractor and has had injections with pain management.    Current medications:  Celebres, norco, aleve, methocarbamal  Physical therapy:  None  Chiropractic care:  Intermittently through the years with last visit 6 months ago  Interventional Procedures:  Injections in the thoracic and lumbar region, unsure if JACOBO with Dr. Bogdan Mckeon     On exam, there is 5/5 strength with 1+ DTR and no sensory deficits.  Gait and station fluid.  Denies bowel/ bladder dysfunction.  Full range of motion of the upper and lower extremities. No pain with axial facet loading.  No SI joint pain on palpation.  No pain with lumbar flexion/ extension.    No imaging.    Mr. Pina has chronic left scapular and upper/ mid lumbar back pain with questionable radicular pain.  He has no neurological deficits.  Pain patten can be myofasical and/ or related to potential degenerative changes given his weight.  He is fairly young so I would not expect extension degenerative changes.  Since he has tried medications, chiropractic care and injections without relief thus far, I recommend obtaining xray and MRI of the thoracic and lumbar spine to further assess before further considering treatment options.  Follow up in clinic after imaging.  We will obtain records from Dr. Mckeon as well.      Visit Diagnosis:  Chronic bilateral low back pain without sciatica  -     X-Ray Lumbar Complete Including Flex And Ext; Future; Expected date: 01/06/2022  -     MRI Lumbar Spine Without Contrast; Future; Expected date: 01/06/2022    Pain in thoracic spine  -     MRI Thoracic Spine Without Contrast; Future; Expected date: 01/06/2022  -     X-Ray Thoracic Spine AP Lateral; Future; Expected date: 01/06/2022    Dorsalgia, unspecified        Total time spent counseling greater than fifty  percent of total visit time.  Counseling included discussion regarding imaging findings, diagnosis possibilities, treatment options, risks and benefits.   The patient had many questions regarding the options and long-term effects.              97.9

## 2023-02-16 LAB — SURGICAL PATHOLOGY STUDY: SIGNIFICANT CHANGE UP

## 2023-02-21 DIAGNOSIS — Z09 ENCOUNTER FOR FOLLOW-UP EXAMINATION AFTER COMPLETED TREATMENT FOR CONDITIONS OTHER THAN MALIGNANT NEOPLASM: ICD-10-CM

## 2023-02-21 DIAGNOSIS — C61 MALIGNANT NEOPLASM OF PROSTATE: ICD-10-CM

## 2023-02-21 DIAGNOSIS — Z92.21 PERSONAL HISTORY OF ANTINEOPLASTIC CHEMOTHERAPY: ICD-10-CM

## 2023-02-21 DIAGNOSIS — Z91.041 RADIOGRAPHIC DYE ALLERGY STATUS: ICD-10-CM

## 2023-02-21 DIAGNOSIS — Z85.828 PERSONAL HISTORY OF OTHER MALIGNANT NEOPLASM OF SKIN: ICD-10-CM

## 2023-02-21 DIAGNOSIS — Z92.3 PERSONAL HISTORY OF IRRADIATION: ICD-10-CM

## 2023-02-21 DIAGNOSIS — Z88.0 ALLERGY STATUS TO PENICILLIN: ICD-10-CM
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
## 2023-02-21 DIAGNOSIS — K91.61 INTRAOPERATIVE HEMORRHAGE AND HEMATOMA OF A DIGESTIVE SYSTEM ORGAN OR STRUCTURE COMPLICATING A DIGESTIVE SYSTEM PROCEDURE: ICD-10-CM
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
## 2023-02-21 DIAGNOSIS — K52.9 NONINFECTIVE GASTROENTERITIS AND COLITIS, UNSPECIFIED: ICD-10-CM

## 2023-02-21 DIAGNOSIS — Z79.82 LONG TERM (CURRENT) USE OF ASPIRIN: ICD-10-CM

## 2023-02-21 DIAGNOSIS — Z86.73 PERSONAL HISTORY OF TRANSIENT ISCHEMIC ATTACK (TIA), AND CEREBRAL INFARCTION WITHOUT RESIDUAL DEFICITS: ICD-10-CM

## 2023-02-21 DIAGNOSIS — T18.4XXA FOREIGN BODY IN COLON, INITIAL ENCOUNTER: ICD-10-CM

## 2023-02-21 DIAGNOSIS — N18.31 CHRONIC KIDNEY DISEASE, STAGE 3A: ICD-10-CM

## 2023-02-21 DIAGNOSIS — M10.9 GOUT, UNSPECIFIED: ICD-10-CM

## 2023-02-21 DIAGNOSIS — Z88.1 ALLERGY STATUS TO OTHER ANTIBIOTIC AGENTS STATUS: ICD-10-CM

## 2023-06-07 ENCOUNTER — NON-APPOINTMENT (OUTPATIENT)
Age: 83
End: 2023-06-07

## 2023-08-31 ENCOUNTER — RX RENEWAL (OUTPATIENT)
Age: 83
End: 2023-08-31

## 2023-09-01 RX ORDER — ALLOPURINOL 300 MG/1
300 TABLET ORAL
Qty: 90 | Refills: 3 | Status: ACTIVE | COMMUNITY
Start: 2020-06-26 | End: 1900-01-01

## 2024-01-03 ENCOUNTER — APPOINTMENT (OUTPATIENT)
Dept: FAMILY MEDICINE | Facility: CLINIC | Age: 84
End: 2024-01-03
Payer: MEDICARE

## 2024-01-03 VITALS
BODY MASS INDEX: 28 KG/M2 | WEIGHT: 200 LBS | TEMPERATURE: 98.2 F | SYSTOLIC BLOOD PRESSURE: 160 MMHG | RESPIRATION RATE: 61 BRPM | HEIGHT: 71 IN | HEART RATE: 63 BPM | OXYGEN SATURATION: 98 % | DIASTOLIC BLOOD PRESSURE: 77 MMHG

## 2024-01-03 VITALS — DIASTOLIC BLOOD PRESSURE: 82 MMHG | SYSTOLIC BLOOD PRESSURE: 150 MMHG

## 2024-01-03 DIAGNOSIS — R03.0 ELEVATED BLOOD-PRESSURE READING, W/OUT DIAGNOSIS OF HYPERTENSION: ICD-10-CM

## 2024-01-03 DIAGNOSIS — N40.0 BENIGN PROSTATIC HYPERPLASIA WITHOUT LOWER URINARY TRACT SYMPMS: ICD-10-CM

## 2024-01-03 DIAGNOSIS — R30.0 DYSURIA: ICD-10-CM

## 2024-01-03 PROCEDURE — 99215 OFFICE O/P EST HI 40 MIN: CPT

## 2024-01-03 RX ORDER — APIXABAN 5 MG/1
5 TABLET, FILM COATED ORAL
Qty: 60 | Refills: 2 | Status: ACTIVE | COMMUNITY
Start: 2024-01-03

## 2024-01-03 RX ORDER — METOPROLOL SUCCINATE 25 MG/1
25 TABLET, EXTENDED RELEASE ORAL DAILY
Qty: 21 | Refills: 0 | Status: ACTIVE | COMMUNITY
Start: 2024-01-03 | End: 1900-01-01

## 2024-01-03 NOTE — PHYSICAL EXAM
[No Acute Distress] : no acute distress [Well Nourished] : well nourished [Well Developed] : well developed [Well-Appearing] : well-appearing [Normal Sclera/Conjunctiva] : normal sclera/conjunctiva [PERRL] : pupils equal round and reactive to light [EOMI] : extraocular movements intact [Normal Outer Ear/Nose] : the outer ears and nose were normal in appearance [Normal Oropharynx] : the oropharynx was normal [No JVD] : no jugular venous distention [No Lymphadenopathy] : no lymphadenopathy [Supple] : supple [Thyroid Normal, No Nodules] : the thyroid was normal and there were no nodules present [No Respiratory Distress] : no respiratory distress  [No Accessory Muscle Use] : no accessory muscle use [Clear to Auscultation] : lungs were clear to auscultation bilaterally [Normal Rate] : normal rate  [Regular Rhythm] : with a regular rhythm [Normal S1, S2] : normal S1 and S2 [No Murmur] : no murmur heard [No Carotid Bruits] : no carotid bruits [No Abdominal Bruit] : a ~M bruit was not heard ~T in the abdomen [No Varicosities] : no varicosities [Pedal Pulses Present] : the pedal pulses are present [No Edema] : there was no peripheral edema [No Palpable Aorta] : no palpable aorta [No Extremity Clubbing/Cyanosis] : no extremity clubbing/cyanosis [Soft] : abdomen soft [Non Tender] : non-tender [Non-distended] : non-distended [No Masses] : no abdominal mass palpated [No HSM] : no HSM [Normal Bowel Sounds] : normal bowel sounds [Normal Posterior Cervical Nodes] : no posterior cervical lymphadenopathy [Normal Anterior Cervical Nodes] : no anterior cervical lymphadenopathy [No CVA Tenderness] : no CVA  tenderness [No Spinal Tenderness] : no spinal tenderness [No Joint Swelling] : no joint swelling [Grossly Normal Strength/Tone] : grossly normal strength/tone [No Rash] : no rash [Coordination Grossly Intact] : coordination grossly intact [No Focal Deficits] : no focal deficits [Normal Gait] : normal gait [Deep Tendon Reflexes (DTR)] : deep tendon reflexes were 2+ and symmetric [Normal Affect] : the affect was normal [Normal Insight/Judgement] : insight and judgment were intact [FreeTextEntry1] : Normal rectal exam-no prostate tenderness noted

## 2024-01-03 NOTE — HISTORY OF PRESENT ILLNESS
[FreeTextEntry8] : Dysuria, urinary frequency, urgency and hesitancy that has been worsening for two weeks.  Denies fevers, chills, and abdominal or back pains. Also denies any rectal pains  Has been diagnosed with paroxysmal A-fib.  Notes to only have episodes while asleep-while checking sleep recorder.  Possibly undiagnosed sleep apnea.  Since he was started on metoprolol, tamsulosin was stopped due to dizziness.  Wife restarted tamsulosin about 3 to 4 days ago with minimal relief.  History of prostate cancer.  Received radiation and hormonal treatment.  Reports that son takes blood pressure at home which is always normal per wife

## 2024-01-03 NOTE — ASSESSMENT
[FreeTextEntry1] : Appears to have uncomplicated UTI-will treat empirically with nitrofurantoin.  Low suspicion for complicated UTI or prostatitis. Will follow-up with neurologist later this month Will send UA/UC Increase fluids Continue with tamsulosin  Sleep study ordered-to follow-up with cardiologist Possible whitecoat syndrome with elevated blood pressure-monitor blood pressure at home and return back to the PCP if it remains elevated.

## 2024-01-05 LAB
APPEARANCE: CLEAR
BACTERIA UR CULT: NORMAL
BACTERIA: NEGATIVE /HPF
BILIRUBIN URINE: NEGATIVE
BLOOD URINE: NEGATIVE
CAST: 0 /LPF
COLOR: YELLOW
EPITHELIAL CELLS: 0 /HPF
GLUCOSE QUALITATIVE U: NEGATIVE MG/DL
KETONES URINE: NEGATIVE MG/DL
LEUKOCYTE ESTERASE URINE: NEGATIVE
MICROSCOPIC-UA: NORMAL
NITRITE URINE: NEGATIVE
PH URINE: 6
PROTEIN URINE: NEGATIVE MG/DL
RED BLOOD CELLS URINE: 1 /HPF
SPECIFIC GRAVITY URINE: 1.01
UROBILINOGEN URINE: 0.2 MG/DL
WHITE BLOOD CELLS URINE: 0 /HPF

## 2024-01-16 ENCOUNTER — APPOINTMENT (OUTPATIENT)
Dept: ULTRASOUND IMAGING | Facility: HOSPITAL | Age: 84
End: 2024-01-16
Payer: MEDICARE

## 2024-01-16 ENCOUNTER — OUTPATIENT (OUTPATIENT)
Dept: OUTPATIENT SERVICES | Facility: HOSPITAL | Age: 84
LOS: 1 days | End: 2024-01-16
Payer: MEDICARE

## 2024-01-16 DIAGNOSIS — Z98.890 OTHER SPECIFIED POSTPROCEDURAL STATES: Chronic | ICD-10-CM

## 2024-01-16 DIAGNOSIS — Z00.8 ENCOUNTER FOR OTHER GENERAL EXAMINATION: ICD-10-CM

## 2024-01-16 PROCEDURE — 76770 US EXAM ABDO BACK WALL COMP: CPT | Mod: 26

## 2024-01-16 PROCEDURE — 76770 US EXAM ABDO BACK WALL COMP: CPT

## 2024-03-11 ENCOUNTER — APPOINTMENT (OUTPATIENT)
Dept: FAMILY MEDICINE | Facility: CLINIC | Age: 84
End: 2024-03-11
Payer: MEDICARE

## 2024-03-11 VITALS
OXYGEN SATURATION: 98 % | DIASTOLIC BLOOD PRESSURE: 75 MMHG | WEIGHT: 200 LBS | BODY MASS INDEX: 28 KG/M2 | HEIGHT: 71 IN | SYSTOLIC BLOOD PRESSURE: 145 MMHG | HEART RATE: 71 BPM | TEMPERATURE: 97.6 F | RESPIRATION RATE: 16 BRPM

## 2024-03-11 DIAGNOSIS — C61 MALIGNANT NEOPLASM OF PROSTATE: ICD-10-CM

## 2024-03-11 DIAGNOSIS — M10.9 GOUT, UNSPECIFIED: ICD-10-CM

## 2024-03-11 DIAGNOSIS — D69.6 THROMBOCYTOPENIA, UNSPECIFIED: ICD-10-CM

## 2024-03-11 DIAGNOSIS — N18.31 CHRONIC KIDNEY DISEASE, STAGE 3A: ICD-10-CM

## 2024-03-11 DIAGNOSIS — Z00.00 ENCOUNTER FOR GENERAL ADULT MEDICAL EXAMINATION W/OUT ABNORMAL FINDINGS: ICD-10-CM

## 2024-03-11 DIAGNOSIS — E78.5 HYPERLIPIDEMIA, UNSPECIFIED: ICD-10-CM

## 2024-03-11 DIAGNOSIS — Z88.9 ALLERGY STATUS TO UNSPECIFIED DRUGS, MEDICAMENTS AND BIOLOGICAL SUBSTANCES: ICD-10-CM

## 2024-03-11 PROCEDURE — 36415 COLL VENOUS BLD VENIPUNCTURE: CPT

## 2024-03-11 PROCEDURE — G0439: CPT

## 2024-03-11 RX ORDER — TAMSULOSIN HYDROCHLORIDE 0.4 MG/1
0.4 CAPSULE ORAL
Qty: 90 | Refills: 3 | Status: ACTIVE | COMMUNITY
Start: 2020-06-26

## 2024-03-11 NOTE — REVIEW OF SYSTEMS
[Hearing Loss] : hearing loss [Earache] : no earache [Nosebleeds] : no nosebleeds [Postnasal Drip] : no postnasal drip [Nasal Discharge] : no nasal discharge [Sore Throat] : no sore throat [Hoarseness] : no hoarseness [Negative] : Heme/Lymph

## 2024-03-11 NOTE — COUNSELING
[Adequate lighting] : Adequate lighting [Fall prevention counseling provided] : Fall prevention counseling provided [No throw rugs] : No throw rugs [Use proper foot wear] : Use proper foot wear [Behavioral health counseling provided] : Behavioral health counseling provided [Sleep ___ hours/day] : Sleep [unfilled] hours/day [Engage in a relaxing activity] : Engage in a relaxing activity [Plan in advance] : Plan in advance [None] : None [AUDIT-C Screening administered and reviewed] : AUDIT-C Screening administered and reviewed [Good understanding] : Patient has a good understanding of lifestyle changes and steps needed to achieve self management goal

## 2024-03-11 NOTE — ASSESSMENT
[FreeTextEntry1] : Assessment and plan:  1.  Annual wellness exam no acute findings.  2.  Comprehensive blood work drawn in office by examiner  3.  History of paroxysmal atrial fibrillation patient is followed closely by cardiology at the [facility].  Patient within the past year has already had stress test, echocardiogram and electrocardiogram.  4.  Up-to-date with gastroenterology status post colonoscopy which was done December 2022 patient is scheduled for follow-up.  5.  Continue present medical management without change  6.  History of prostate CA status post hormonal treatment and radiation treatment at [facility] and also followed closely by local urologist. [FreeTextEntry1] : 60-year-old M with PMHx of hypertrophic cardiomyopathy presents with c/o decreased right hand grasp, intermittent spasm of right hand when attempting to perform fine motor movements or sustained activity and right middle trigger finger.  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  Mild retrolisthesis C3 on C4 and C6 on C7 .  At C2/3 right lateral recess superimposed disc protrusion with mild contact of right lateral spinal cord . C4/5-6/7 mild–moderate neural foraminal narrowing, at C7-T1 mod-sev left and moderate right foraminal narrowing.\par \par - The cord impingement and abutment at C3/4-C4/5 seems significantly less in the current MRI compared with 2/14/22, neural foramina abutment also seems less except at C7-T1 levels\par -Patient advised to follow-up with Dr. 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

## 2024-03-11 NOTE — PHYSICAL EXAM
[No Acute Distress] : no acute distress [Well Nourished] : well nourished [Well Developed] : well developed [Well-Appearing] : well-appearing [Normal Voice/Communication] : normal voice/communication [Normal Sclera/Conjunctiva] : normal sclera/conjunctiva [PERRL] : pupils equal round and reactive to light [EOMI] : extraocular movements intact [Normal Outer Ear/Nose] : the outer ears and nose were normal in appearance [Normal Oropharynx] : the oropharynx was normal [No JVD] : no jugular venous distention [Supple] : supple [No Lymphadenopathy] : no lymphadenopathy [Thyroid Normal, No Nodules] : the thyroid was normal and there were no nodules present [No Respiratory Distress] : no respiratory distress  [No Accessory Muscle Use] : no accessory muscle use [Clear to Auscultation] : lungs were clear to auscultation bilaterally [Regular Rhythm] : with a regular rhythm [Normal Rate] : normal rate  [Normal S1, S2] : normal S1 and S2 [No Murmur] : no murmur heard [No Abdominal Bruit] : a ~M bruit was not heard ~T in the abdomen [No Carotid Bruits] : no carotid bruits [Pedal Pulses Present] : the pedal pulses are present [No Varicosities] : no varicosities [No Edema] : there was no peripheral edema [No Palpable Aorta] : no palpable aorta [No Extremity Clubbing/Cyanosis] : no extremity clubbing/cyanosis [Soft] : abdomen soft [Non Tender] : non-tender [No Masses] : no abdominal mass palpated [Non-distended] : non-distended [Normal Bowel Sounds] : normal bowel sounds [No HSM] : no HSM [Normal Posterior Cervical Nodes] : no posterior cervical lymphadenopathy [Normal Anterior Cervical Nodes] : no anterior cervical lymphadenopathy [No CVA Tenderness] : no CVA  tenderness [No Spinal Tenderness] : no spinal tenderness [No Joint Swelling] : no joint swelling [Grossly Normal Strength/Tone] : grossly normal strength/tone [No Rash] : no rash [Coordination Grossly Intact] : coordination grossly intact [Normal Gait] : normal gait [No Focal Deficits] : no focal deficits [Deep Tendon Reflexes (DTR)] : deep tendon reflexes were 2+ and symmetric [Normal Affect] : the affect was normal [Normal Insight/Judgement] : insight and judgment were intact [FreeTextEntry1] : Urology [de-identified] : Urology

## 2024-03-11 NOTE — HEALTH RISK ASSESSMENT
[Very Good] : ~his/her~  mood as very good [Yes] : Yes [1 or 2 (0 pts)] : 1 or 2 (0 points) [Monthly or less (1 pt)] : Monthly or less (1 point) [No] : In the past 12 months have you used drugs other than those required for medical reasons? No [Never (0 pts)] : Never (0 points) [No falls in past year] : Patient reported no falls in the past year [Little interest or pleasure doing things] : 1) Little interest or pleasure doing things [Feeling down, depressed, or hopeless] : 2) Feeling down, depressed, or hopeless [0] : 2) Feeling down, depressed, or hopeless: Not at all (0) [Audit-CScore] : 1 [PHQ-2 Negative - No further assessment needed] : PHQ-2 Negative - No further assessment needed [QPI5Tjnia] : 0 [Patient reported colonoscopy was abnormal] : Patient reported colonoscopy was abnormal [HIV test declined] : HIV test declined [Hepatitis C test offered] : Hepatitis C test offered [Change in mental status noted] : No change in mental status noted [Language] : denies difficulty with language [Behavior] : denies difficulty with behavior [Learning/Retaining New Information] : denies difficulty learning/retaining new information [Handling Complex Tasks] : denies difficulty handling complex tasks [Reasoning] : denies difficulty with reasoning [Spatial Ability and Orientation] : denies difficulty with spatial ability and orientation [None] : None [With Significant Other] : lives with significant other [High School] : high school [Retired] : retired [] :  [Sexually Active] : not sexually active [Fully functional (bathing, dressing, toileting, transferring, walking, feeding)] : Fully functional (bathing, dressing, toileting, transferring, walking, feeding) [Feels Safe at Home] : Feels safe at home [Fully functional (using the telephone, shopping, preparing meals, housekeeping, doing laundry, using] : Fully functional and needs no help or supervision to perform IADLs (using the telephone, shopping, preparing meals, housekeeping, doing laundry, using transportation, managing medications and managing finances) [Reports changes in hearing] : Reports changes in hearing [Reports changes in vision] : Reports no changes in vision [Reports normal functional visual acuity (ie: able to read med bottle)] : Reports poor functional visual acuity.  [Reports changes in dental health] : Reports no changes in dental health [Smoke Detector] : smoke detector [Guns at Home] : no guns at home [Carbon Monoxide Detector] : carbon monoxide detector [Safety elements used in home] : safety elements used in home [Seat Belt] :  uses seat belt [Sunscreen] : uses sunscreen [Travel to Developing Areas] : does not  travel to developing areas [TB Exposure] : is not being exposed to tuberculosis [Caregiver Concerns] : does not have caregiver concerns [ColonoscopyDate] : 12/22 [ColonoscopyComments] : Polyps [de-identified] : SENDY [de-identified] : glasses [Reviewed no changes] : Reviewed, no changes [With Patient/Caregiver] : , with patient/caregiver [Designated Healthcare Proxy] : Designated healthcare proxy [Relationship: ___] : Relationship: [unfilled] [Aggressive treatment] : aggressive treatment [I will adhere to the patient's wishes.] : I will adhere to the patient's wishes. [AdvancecareDate] : 03/24 [Never] : Never

## 2024-03-11 NOTE — HISTORY OF PRESENT ILLNESS
[FreeTextEntry1] : Annual wellness exam. [de-identified] : Patient is an 83-year-old gentleman who presents today for annual wellness exam medical history significant for hyperuricemia, hyperlipidemia, paroxysmal atrial fibrillation, prostate CA status posttreatment, stage III chronic renal insufficiency stable, thrombocytopenia.  Presently the patient is awake alert and oriented x 3 no acute distress, cooperative.

## 2024-03-12 DIAGNOSIS — D64.9 ANEMIA, UNSPECIFIED: ICD-10-CM

## 2024-03-12 LAB
ALBUMIN SERPL ELPH-MCNC: 4.2 G/DL
ALP BLD-CCNC: 68 U/L
ALT SERPL-CCNC: 14 U/L
ANION GAP SERPL CALC-SCNC: 11 MMOL/L
AST SERPL-CCNC: 21 U/L
BASOPHILS # BLD AUTO: 0.03 K/UL
BASOPHILS NFR BLD AUTO: 0.4 %
BILIRUB SERPL-MCNC: 0.7 MG/DL
BUN SERPL-MCNC: 37 MG/DL
CALCIUM SERPL-MCNC: 9.9 MG/DL
CHLORIDE SERPL-SCNC: 105 MMOL/L
CHOLEST SERPL-MCNC: 171 MG/DL
CO2 SERPL-SCNC: 25 MMOL/L
CREAT SERPL-MCNC: 1.35 MG/DL
EGFR: 52 ML/MIN/1.73M2
EOSINOPHIL # BLD AUTO: 0.09 K/UL
EOSINOPHIL NFR BLD AUTO: 1.3 %
GLUCOSE SERPL-MCNC: 94 MG/DL
HCT VFR BLD CALC: 38.9 %
HDLC SERPL-MCNC: 50 MG/DL
HGB BLD-MCNC: 12.5 G/DL
IMM GRANULOCYTES NFR BLD AUTO: 0.6 %
LDLC SERPL CALC-MCNC: 97 MG/DL
LYMPHOCYTES # BLD AUTO: 1.45 K/UL
LYMPHOCYTES NFR BLD AUTO: 20.4 %
MAN DIFF?: NORMAL
MCHC RBC-ENTMCNC: 29.3 PG
MCHC RBC-ENTMCNC: 32.1 GM/DL
MCV RBC AUTO: 91.1 FL
MONOCYTES # BLD AUTO: 0.86 K/UL
MONOCYTES NFR BLD AUTO: 12.1 %
NEUTROPHILS # BLD AUTO: 4.65 K/UL
NEUTROPHILS NFR BLD AUTO: 65.2 %
NONHDLC SERPL-MCNC: 120 MG/DL
PLATELET # BLD AUTO: 181 K/UL
POTASSIUM SERPL-SCNC: 4.3 MMOL/L
PROT SERPL-MCNC: 6.8 G/DL
PSA SERPL-MCNC: <0.01 NG/ML
RBC # BLD: 4.27 M/UL
RBC # FLD: 15.1 %
SODIUM SERPL-SCNC: 140 MMOL/L
TRIGL SERPL-MCNC: 135 MG/DL
TSH SERPL-ACNC: 3.11 UIU/ML
URATE SERPL-MCNC: 4.1 MG/DL
WBC # FLD AUTO: 7.12 K/UL

## 2024-03-21 ENCOUNTER — APPOINTMENT (OUTPATIENT)
Dept: GASTROENTEROLOGY | Facility: CLINIC | Age: 84
End: 2024-03-21

## 2024-03-26 ENCOUNTER — APPOINTMENT (OUTPATIENT)
Dept: PEDIATRIC ALLERGY IMMUNOLOGY | Facility: CLINIC | Age: 84
End: 2024-03-26
Payer: MEDICARE

## 2024-03-26 VITALS
WEIGHT: 195 LBS | BODY MASS INDEX: 26.41 KG/M2 | HEART RATE: 72 BPM | HEIGHT: 72 IN | SYSTOLIC BLOOD PRESSURE: 128 MMHG | RESPIRATION RATE: 16 BRPM | DIASTOLIC BLOOD PRESSURE: 84 MMHG | OXYGEN SATURATION: 96 %

## 2024-03-26 DIAGNOSIS — Z88.1 ALLERGY STATUS TO OTHER ANTIBIOTIC AGENTS: ICD-10-CM

## 2024-03-26 DIAGNOSIS — Z88.9 ALLERGY STATUS TO UNSPECIFIED DRUGS, MEDICAMENTS AND BIOLOGICAL SUBSTANCES: ICD-10-CM

## 2024-03-26 PROCEDURE — 99203 OFFICE O/P NEW LOW 30 MIN: CPT

## 2024-03-26 NOTE — REASON FOR VISIT
[Allergy Evaluation/ Skin Testing] : allergy evaluation and or skin testing [Initial Consultation] : an initial consultation for [Spouse] : spouse [To Medication] : allergy to medication

## 2024-03-27 PROBLEM — Z88.9 DRUG ALLERGY: Status: ACTIVE | Noted: 2024-03-27

## 2024-03-27 PROBLEM — Z88.1 ALLERGY TO ANTIBIOTIC: Status: ACTIVE | Noted: 2024-01-03

## 2024-04-02 NOTE — PHYSICAL EXAM
[Alert] : alert [Well Nourished] : well nourished [No Acute Distress] : no acute distress [Well Developed] : well developed [No Discharge] : no discharge [Sclera Not Icteric] : sclera not icteric [Normal Rate and Effort] : normal respiratory rhythm and effort [No Crackles] : no crackles [Normal S1, S2] : normal S1 and S2 [Normal Rate] : heart rate was normal  [Regular Rhythm] : with a regular rhythm [Skin Intact] : skin intact  [Conjunctival Erythema] : no conjunctival erythema [Wheezing] : no wheezing was heard

## 2024-04-02 NOTE — HISTORY OF PRESENT ILLNESS
[Asthma] : asthma [Allergic Rhinitis] : allergic rhinitis [Eczematous rashes] : eczematous rashes [Venom Reactions] : venom reactions [Food Allergies] : food allergies [de-identified] : 83 year old gentleman who presents for evaluation of medication allergies.    Levaquin, Ciprofloxacin, Penicillin - prickly heat rash across the chest/torso (maybe hives?) about 25-30 years ago around the same time.  He was taking the antibiotics at home on separate occasions.  Unclear as to why he was on the antibiotics.  No respiratory symptoms.  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  Around 2022, he had CT scan with contrast.  He developed a beet red rash in his pelvic area the following day after the scan.  The rash lasted a few days.  He had this completed at [facility].  No trouble breathing or immediate reaction when getting the scan.

## 2024-04-02 NOTE — CONSULT LETTER
[Dear  ___] : Dear  [unfilled], [Please see my note below.] : Please see my note below. [Courtesy Letter:] : I had the pleasure of seeing your patient, [unfilled], in my office today. [Consult Closing:] : Thank you very much for allowing me to participate in the care of this patient.  If you have any questions, please do not hesitate to contact me. [Sincerely,] : Sincerely, [FreeTextEntry3] : Dr. Wanda Desir

## 2024-04-12 ENCOUNTER — OUTPATIENT (OUTPATIENT)
Dept: OUTPATIENT SERVICES | Facility: HOSPITAL | Age: 84
LOS: 1 days | End: 2024-04-12
Payer: MEDICARE

## 2024-04-12 DIAGNOSIS — G47.33 OBSTRUCTIVE SLEEP APNEA (ADULT) (PEDIATRIC): ICD-10-CM

## 2024-04-12 DIAGNOSIS — Z98.890 OTHER SPECIFIED POSTPROCEDURAL STATES: Chronic | ICD-10-CM

## 2024-04-12 PROCEDURE — 95800 SLP STDY UNATTENDED: CPT

## 2024-05-01 ENCOUNTER — APPOINTMENT (OUTPATIENT)
Dept: GASTROENTEROLOGY | Facility: HOSPITAL | Age: 84
End: 2024-05-01

## 2024-05-01 ENCOUNTER — OUTPATIENT (OUTPATIENT)
Dept: OUTPATIENT SERVICES | Facility: HOSPITAL | Age: 84
LOS: 1 days | Discharge: HOME | End: 2024-05-01
Payer: MEDICARE

## 2024-05-01 VITALS
OXYGEN SATURATION: 100 % | DIASTOLIC BLOOD PRESSURE: 69 MMHG | WEIGHT: 195.11 LBS | SYSTOLIC BLOOD PRESSURE: 130 MMHG | HEART RATE: 66 BPM | TEMPERATURE: 98 F | RESPIRATION RATE: 19 BRPM

## 2024-05-01 DIAGNOSIS — K63.5 POLYP OF COLON: ICD-10-CM

## 2024-05-01 DIAGNOSIS — Z98.890 OTHER SPECIFIED POSTPROCEDURAL STATES: Chronic | ICD-10-CM

## 2024-05-01 PROCEDURE — 45330 DIAGNOSTIC SIGMOIDOSCOPY: CPT

## 2024-05-01 RX ORDER — BICALUTAMIDE 50 MG/1
1 TABLET, FILM COATED ORAL
Qty: 0 | Refills: 0 | DISCHARGE

## 2024-05-01 RX ORDER — METOPROLOL TARTRATE 50 MG
1 TABLET ORAL
Refills: 0 | DISCHARGE

## 2024-05-01 RX ORDER — SIMETHICONE 80 MG/1
1 TABLET, CHEWABLE ORAL
Qty: 0 | Refills: 0 | DISCHARGE

## 2024-05-01 RX ORDER — CHOLECALCIFEROL (VITAMIN D3) 125 MCG
1 CAPSULE ORAL
Qty: 0 | Refills: 0 | DISCHARGE

## 2024-05-01 RX ORDER — APIXABAN 2.5 MG/1
1 TABLET, FILM COATED ORAL
Refills: 0 | DISCHARGE

## 2024-05-01 RX ORDER — CHOLECALCIFEROL (VITAMIN D3) 125 MCG
1 CAPSULE ORAL
Refills: 0 | DISCHARGE

## 2024-05-01 RX ORDER — TAMSULOSIN HYDROCHLORIDE 0.4 MG/1
1 CAPSULE ORAL
Qty: 0 | Refills: 0 | DISCHARGE

## 2024-05-01 RX ORDER — ALLOPURINOL 300 MG
1 TABLET ORAL
Qty: 0 | Refills: 0 | DISCHARGE

## 2024-05-01 RX ORDER — MULTIVIT-MIN/FERROUS GLUCONATE 9 MG/15 ML
0 LIQUID (ML) ORAL
Qty: 0 | Refills: 0 | DISCHARGE

## 2024-05-06 DIAGNOSIS — Z86.010 PERSONAL HISTORY OF COLONIC POLYPS: ICD-10-CM

## 2024-05-06 DIAGNOSIS — I48.91 UNSPECIFIED ATRIAL FIBRILLATION: ICD-10-CM

## 2024-05-06 DIAGNOSIS — Z09 ENCOUNTER FOR FOLLOW-UP EXAMINATION AFTER COMPLETED TREATMENT FOR CONDITIONS OTHER THAN MALIGNANT NEOPLASM: ICD-10-CM

## 2024-05-06 DIAGNOSIS — K64.9 UNSPECIFIED HEMORRHOIDS: ICD-10-CM

## 2024-05-06 DIAGNOSIS — I10 ESSENTIAL (PRIMARY) HYPERTENSION: ICD-10-CM

## 2024-05-06 DIAGNOSIS — Z88.0 ALLERGY STATUS TO PENICILLIN: ICD-10-CM

## 2024-05-06 DIAGNOSIS — G47.33 OBSTRUCTIVE SLEEP APNEA (ADULT) (PEDIATRIC): ICD-10-CM

## 2024-05-08 ENCOUNTER — APPOINTMENT (OUTPATIENT)
Dept: INTERNAL MEDICINE | Facility: CLINIC | Age: 84
End: 2024-05-08
Payer: MEDICARE

## 2024-05-08 VITALS
RESPIRATION RATE: 16 BRPM | TEMPERATURE: 99.2 F | WEIGHT: 195 LBS | SYSTOLIC BLOOD PRESSURE: 120 MMHG | HEART RATE: 73 BPM | OXYGEN SATURATION: 99 % | BODY MASS INDEX: 26.41 KG/M2 | HEIGHT: 72 IN | DIASTOLIC BLOOD PRESSURE: 70 MMHG

## 2024-05-08 DIAGNOSIS — G47.33 OBSTRUCTIVE SLEEP APNEA (ADULT) (PEDIATRIC): ICD-10-CM

## 2024-05-08 DIAGNOSIS — G47.10 HYPERSOMNIA, UNSPECIFIED: ICD-10-CM

## 2024-05-08 DIAGNOSIS — I48.0 PAROXYSMAL ATRIAL FIBRILLATION: ICD-10-CM

## 2024-05-08 PROCEDURE — 99204 OFFICE O/P NEW MOD 45 MIN: CPT

## 2024-05-08 PROCEDURE — G2211 COMPLEX E/M VISIT ADD ON: CPT

## 2024-05-08 RX ORDER — CHOLECALCIFEROL (VITAMIN D3) 25 MCG
TABLET ORAL
Refills: 0 | Status: ACTIVE | COMMUNITY

## 2024-05-08 RX ORDER — BICALUTAMIDE 50 MG/1
50 TABLET ORAL
Qty: 90 | Refills: 0 | Status: DISCONTINUED | COMMUNITY
Start: 2022-10-24 | End: 2024-05-08

## 2024-05-08 RX ORDER — VIT A/VIT C/VIT E/ZINC/COPPER 2148-113
TABLET ORAL
Refills: 0 | Status: ACTIVE | COMMUNITY

## 2024-05-08 RX ORDER — ASPIRIN 81 MG/1
81 TABLET, CHEWABLE ORAL
Refills: 0 | Status: DISCONTINUED | COMMUNITY
Start: 2022-07-25 | End: 2024-05-08

## 2024-05-08 RX ORDER — NITROFURANTOIN (MONOHYDRATE/MACROCRYSTALS) 25; 75 MG/1; MG/1
100 CAPSULE ORAL
Qty: 10 | Refills: 0 | Status: DISCONTINUED | COMMUNITY
Start: 2024-01-03 | End: 2024-05-08

## 2024-05-08 RX ORDER — NIRMATRELVIR AND RITONAVIR 300-100 MG
20 X 150 MG & KIT ORAL
Qty: 30 | Refills: 0 | Status: DISCONTINUED | COMMUNITY
Start: 2022-11-16 | End: 2024-05-08

## 2024-05-08 NOTE — REVIEW OF SYSTEMS
[Fatigue] : fatigue [Nasal Congestion] : nasal congestion [Nocturia] : nocturia [Frequency] : dysuria [Arthralgias] : arthralgias [Myalgias] : myalgias [Back Pain] : back pain [Anemia] : no anemia [Negative] : Endocrine

## 2024-05-08 NOTE — PLAN
[TextEntry] : 1. Obstructive Sleep Apnea (JASMIN) - Patient has a sleep study showing 28.2 events per hour, indicating moderate to severe JASMIN. I offered a CPAP titration in lab but the patient declined.  I discussed the pathophysiology of JASMIN.  - Plan: Initiate CPAP therapy. Educated the patient on the importance of consistent CPAP use during nighttime sleep and naps to prevent future episodes of atrial fibrillation. Monitor patient's progress and compliance with CPAP therapy.  2. Atrial Fibrillation - Discussed the role of JASMIN in A. Fib physiology  3. Nocturia - Partly related to JASMIN.  4. Daytime Sleepiness - Patient has an [place] Sleepiness Scale score of 15, indicating excessive daytime sleepiness. - Plan: Initiate CPAP therapy for JASMIN, which may improve daytime sleepiness. Encourage the patient to maintain a consistent sleep schedule and engage in activities during the day to promote wakefulness.  5. Patient's wife's breathing issues - Patient's wife experiences breathing issues at night. - Plan: Recommend that the patient's wife undergo a sleep evaluation to assess for possible sleep apnea or other sleep-related breathing disorders.

## 2024-05-08 NOTE — PHYSICAL EXAM
[No Acute Distress] : no acute distress [Normal Oropharynx] : normal oropharynx [IV] : Mallampati Class: IV [Normal Appearance] : normal appearance [No Neck Mass] : no neck mass [Normal Rate/Rhythm] : normal rate/rhythm [Normal S1, S2] : normal s1, s2 [No Murmurs] : no murmurs [No Resp Distress] : no resp distress [Clear to Auscultation Bilaterally] : clear to auscultation bilaterally [No Abnormalities] : no abnormalities [Benign] : benign [No Clubbing] : no clubbing [No Cyanosis] : no cyanosis [No Edema] : no edema [Oriented x3] : oriented x3 [Normal Affect] : normal affect no

## 2024-05-08 NOTE — HISTORY OF PRESENT ILLNESS
[TextBox_4] : Mr. Migue Stapleton came in for evaluation of JASMIN. He reports a typical sleep routine of retiring around 9:00-9:30 PM, during which he watches television for approximately 30 minutes before falling asleep. He experiences nocturia, waking up three times throughout the night to urinate, which may be related to his history of prostate issues, including a recent prostate procedure. While his wife notes that he snores, Mr. Stapleton denies any episodes of gasping for air during sleep. His typical wake time is between 6:00-6:30 AM, and he generally feels well-rested upon waking. However, since starting Biotropo and Eliquis, he has noticed an increase in daytime napping.  Mr. Stapleton's [place] Sleepiness Scale score is 15, indicating excessive daytime sleepiness compared to the normal range of 10 or less. He reports that his level of daytime sleepiness varies depending on his daily activities and work schedule.  His medical history is significant for anemia, prostate cancer (treated), atrial fibrillation, squamous cell carcinoma on the neck, kidney stones (treated with lithotripsy), and a skin biopsy. Mr. Stapleton denies any family history of sleep disorders, as well as smoking, alcohol consumption, or marijuana use. [Home] : home [TextBox_100] : 04/12/2024 [TextBox_108] : 28.2 [TextBox_116] : 69

## 2024-05-28 ENCOUNTER — APPOINTMENT (OUTPATIENT)
Dept: PEDIATRIC ALLERGY IMMUNOLOGY | Facility: CLINIC | Age: 84
End: 2024-05-28

## 2024-06-11 ENCOUNTER — TRANSCRIPTION ENCOUNTER (OUTPATIENT)
Age: 84
End: 2024-06-11

## 2024-06-13 LAB
ANION GAP SERPL CALC-SCNC: 13 MMOL/L
BASOPHILS # BLD AUTO: 0.02 K/UL
BASOPHILS NFR BLD AUTO: 0.3 %
BUN SERPL-MCNC: 33 MG/DL
CALCIUM SERPL-MCNC: 9.7 MG/DL
CHLORIDE SERPL-SCNC: 106 MMOL/L
CO2 SERPL-SCNC: 22 MMOL/L
CREAT SERPL-MCNC: 1.32 MG/DL
EGFR: 54 ML/MIN/1.73M2
EOSINOPHIL # BLD AUTO: 0.08 K/UL
EOSINOPHIL NFR BLD AUTO: 1.3 %
FOLATE SERPL-MCNC: >20 NG/ML
GLUCOSE SERPL-MCNC: 101 MG/DL
HCT VFR BLD CALC: 38.8 %
HGB BLD-MCNC: 12.7 G/DL
IMM GRANULOCYTES NFR BLD AUTO: 0.3 %
IRON SATN MFR SERPL: 27 %
IRON SERPL-MCNC: 58 UG/DL
LYMPHOCYTES # BLD AUTO: 1.45 K/UL
LYMPHOCYTES NFR BLD AUTO: 24.2 %
MAN DIFF?: NORMAL
MCHC RBC-ENTMCNC: 29.8 PG
MCHC RBC-ENTMCNC: 32.7 GM/DL
MCV RBC AUTO: 91.1 FL
MONOCYTES # BLD AUTO: 0.62 K/UL
MONOCYTES NFR BLD AUTO: 10.4 %
NEUTROPHILS # BLD AUTO: 3.8 K/UL
NEUTROPHILS NFR BLD AUTO: 63.5 %
PLATELET # BLD AUTO: 134 K/UL
POTASSIUM SERPL-SCNC: 4.1 MMOL/L
RBC # BLD: 4.26 M/UL
RBC # FLD: 16 %
SODIUM SERPL-SCNC: 142 MMOL/L
TIBC SERPL-MCNC: 219 UG/DL
UIBC SERPL-MCNC: 161 UG/DL
VIT B12 SERPL-MCNC: 727 PG/ML
WBC # FLD AUTO: 5.99 K/UL

## 2024-07-05 ENCOUNTER — RX RENEWAL (OUTPATIENT)
Age: 84
End: 2024-07-05

## 2024-07-24 ENCOUNTER — NON-APPOINTMENT (OUTPATIENT)
Age: 84
End: 2024-07-24

## 2024-07-25 ENCOUNTER — APPOINTMENT (OUTPATIENT)
Dept: INTERNAL MEDICINE | Facility: CLINIC | Age: 84
End: 2024-07-25
Payer: MEDICARE

## 2024-07-25 VITALS
SYSTOLIC BLOOD PRESSURE: 160 MMHG | BODY MASS INDEX: 26.01 KG/M2 | TEMPERATURE: 98.1 F | HEART RATE: 65 BPM | HEIGHT: 72 IN | DIASTOLIC BLOOD PRESSURE: 78 MMHG | RESPIRATION RATE: 16 BRPM | OXYGEN SATURATION: 97 % | WEIGHT: 192 LBS

## 2024-07-25 DIAGNOSIS — G47.33 OBSTRUCTIVE SLEEP APNEA (ADULT) (PEDIATRIC): ICD-10-CM

## 2024-07-25 DIAGNOSIS — J30.9 ALLERGIC RHINITIS, UNSPECIFIED: ICD-10-CM

## 2024-07-25 PROCEDURE — 99214 OFFICE O/P EST MOD 30 MIN: CPT

## 2024-07-25 PROCEDURE — G2211 COMPLEX E/M VISIT ADD ON: CPT

## 2024-07-25 RX ORDER — FLUTICASONE PROPIONATE 50 UG/1
50 SPRAY, METERED NASAL
Qty: 48 | Refills: 0 | Status: ACTIVE | COMMUNITY
Start: 2024-07-25 | End: 1900-01-01

## 2024-07-26 NOTE — RESULTS/DATA
[TextEntry] : compliance data reviewed 30/30 days   - CPAP Usage Data (June 24th to July 23rd): Used 30 out of 30 days, average usage six hours and two minutes per night, pressure setting ranged from 5 to 20, optimal pressure found at 16.   - Significant leaks noted at 46 liters per minute.  - residual AHI 27.5 events per hour <<----- Click to add NO significant Past Surgical History

## 2024-07-26 NOTE — PHYSICAL EXAM
[TextEntry] : Constitutional: no acute distress   HEENT: normal oropharynx, Mallampati Class: IV, nasal congestion Neck: normal appearance, no neck mass  Cardiac: normal rate/rhythm, normal s1, s2  Pulmonary: no resp distress, clear to auscultation bilaterally, no r/r/w Chest: no abnormalities Extremities: no clubbing, no cyanosis, no edema  Skin: normal color/ pigmentation  Psychiatric: oriented x3, normal affect

## 2024-07-26 NOTE — PLAN
[TextEntry] : Obstructive Sleep Apnea (JASMIN): - Patient has been using the CPAP machine consistently for 30 days, averaging six hours and two minutes per night. However, the current pressure setting of 5 to 20 with an optimal pressure of 16 has not been effective in resolving his apnea events, and significant leaks (46 L/min) are present. - Plan:    a. Proceed with a CPAP titration study at the sleep center to determine the best pressure setting for the patient.    b. The sleep center will also assist in finding a suitable mask to minimize leaks.   c. Schedule a follow-up appointment in 2-3 months to review the data and make any necessary adjustments to the CPAP machine.  Nasal Congestion: - Patient reports occasional nasal congestion and sneezing while using the CPAP mask. - Plan:   a. Prescribe Flonase (topical steroid) to help alleviate nasal congestion.   b. The patient should use the medication as directed and report any side effects or persistent symptoms at the follow-up appointment.

## 2024-07-26 NOTE — HISTORY OF PRESENT ILLNESS
[FreeTextEntry1] : The patient reports having started using the CPAP machine and has tried three different masks, with the current one covering both the nose and mouth. Despite using the machine, he and his partner are not sleeping well due to air leaks and the need for constant adjustments throughout the night. The patient experiences good sleep for the first five to six hours    He also reports occasional nasal congestion and sneezing while using the CPAP mask. He has been using a saline nasal spray but is interested in trying Flonase, a topical steroid, to alleviate these symptoms.

## 2024-08-12 NOTE — ASU PREOP CHECKLIST - AS BP NONINV SITE
[FreeTextEntry1] : Start Prednisone continue inhalers CXR stat ordered  explained need for pulmonary follow up, Aspergillus levels ordered last visit, reviewed. check labs today BP rechecked, 100/70 advised if symptoms not improved in 24-48 hrs or if clinically worse, will need to go to ED.   left upper arm

## 2024-09-04 DIAGNOSIS — G47.33 OBSTRUCTIVE SLEEP APNEA (ADULT) (PEDIATRIC): ICD-10-CM

## 2024-09-05 ENCOUNTER — OUTPATIENT (OUTPATIENT)
Dept: OUTPATIENT SERVICES | Facility: HOSPITAL | Age: 84
LOS: 1 days | End: 2024-09-05
Payer: MEDICARE

## 2024-09-05 DIAGNOSIS — Z98.890 OTHER SPECIFIED POSTPROCEDURAL STATES: Chronic | ICD-10-CM

## 2024-09-05 DIAGNOSIS — G47.33 OBSTRUCTIVE SLEEP APNEA (ADULT) (PEDIATRIC): ICD-10-CM

## 2024-09-05 PROCEDURE — 95811 POLYSOM 6/>YRS CPAP 4/> PARM: CPT | Mod: 26

## 2024-09-05 PROCEDURE — 95811 POLYSOM 6/>YRS CPAP 4/> PARM: CPT

## 2024-09-09 ENCOUNTER — NON-APPOINTMENT (OUTPATIENT)
Age: 84
End: 2024-09-09

## 2024-09-24 ENCOUNTER — APPOINTMENT (OUTPATIENT)
Dept: INTERNAL MEDICINE | Facility: CLINIC | Age: 84
End: 2024-09-24
Payer: MEDICARE

## 2024-09-24 VITALS
RESPIRATION RATE: 16 BRPM | SYSTOLIC BLOOD PRESSURE: 140 MMHG | HEIGHT: 72 IN | DIASTOLIC BLOOD PRESSURE: 70 MMHG | TEMPERATURE: 98 F | WEIGHT: 188 LBS | HEART RATE: 61 BPM | OXYGEN SATURATION: 98 % | BODY MASS INDEX: 25.47 KG/M2

## 2024-09-24 DIAGNOSIS — G47.33 OBSTRUCTIVE SLEEP APNEA (ADULT) (PEDIATRIC): ICD-10-CM

## 2024-09-24 PROCEDURE — G2211 COMPLEX E/M VISIT ADD ON: CPT

## 2024-09-24 PROCEDURE — 99214 OFFICE O/P EST MOD 30 MIN: CPT

## 2024-09-24 NOTE — RESULTS/DATA
[TextEntry] : BiPAP titraiton reviewed with the patient. New settings 20/15  Compliance data last seven days rAHI better controlled 9.7 compared to 24 on last visit

## 2024-09-24 NOTE — REVIEW OF SYSTEMS
[EDS: ESS=____] : daytime somnolence: ESS=[unfilled] [Fatigue] : fatigue [Recent Wt Loss (___ Lbs)] : no recent weight loss [Negative] : Cardiovascular

## 2024-09-24 NOTE — HISTORY OF PRESENT ILLNESS
[FreeTextEntry1] : The patient has been using a CPAP machine at home but has experienced significant issues with pressure settings and sleeping positions, particularly when sleeping on his back. Since May, the patient has been struggling with an ill-fitting mask and high pressures during the night, causing discomfort and poor sleep quality.  Recently, the patient's granddaughter discovered through the patient's medical notes that sleeping on his side could improve his condition. Over the last ten days, the patient has been sleeping on his side, resulting in a significant improvement in sleep quality. The patient now sleeps for about seven hours with only 7.2 apnea episodes per hour, a notable improvement from previous settings. A sleep study indicated a need for higher pressure, recommending a switch to BiPAP with settings of 20 over 15. The patient reports less daytime sleepiness and better overall sleep since adjusting his sleeping position.

## 2024-09-24 NOTE — PLAN
[TextEntry] : Obstructive Sleep Apnea (JASMIN): - The patient has a history of obstructive sleep apnea, initially managed with a CPAP machine. Recent adjustments in sleeping position have led to significant improvements in sleep quality and AHI reduction from 27.5 to 9.8. - Plan: - Continue using the CPAP machine until the BiPAP machine arrives. Order is already in the system - Monitor and report any issues with the new BiPAP machine. - Advise the patient to call [facility] for any troubleshooting with the machine, mask, or tubing. - Reinforce the importance of using the machine every night. -  the patient on the risks of daytime sleepiness and advise against driving or operating heavy machinery if sleepy. - Schedule follow-up in three months to review data and assess the effectiveness of the BiPAP therapy.  Follow up: Follow up in three months or sooner as needed.  Nasal Congestion: - Patient reports occasional nasal congestion and sneezing while using the CPAP mask. - Plan:   a. Continue Flonase (topical steroid) to help alleviate nasal congestion.   b. The patient should use the medication as directed and report any side effects or persistent symptoms at the follow-up appointment.

## 2024-12-18 ENCOUNTER — APPOINTMENT (OUTPATIENT)
Dept: INTERNAL MEDICINE | Facility: CLINIC | Age: 84
End: 2024-12-18
Payer: MEDICARE

## 2024-12-18 VITALS
HEIGHT: 72 IN | SYSTOLIC BLOOD PRESSURE: 122 MMHG | HEART RATE: 63 BPM | DIASTOLIC BLOOD PRESSURE: 60 MMHG | WEIGHT: 194.13 LBS | OXYGEN SATURATION: 97 % | TEMPERATURE: 97.2 F | BODY MASS INDEX: 26.3 KG/M2

## 2024-12-18 DIAGNOSIS — G47.33 OBSTRUCTIVE SLEEP APNEA (ADULT) (PEDIATRIC): ICD-10-CM

## 2024-12-18 PROCEDURE — 99214 OFFICE O/P EST MOD 30 MIN: CPT

## 2024-12-18 PROCEDURE — G2211 COMPLEX E/M VISIT ADD ON: CPT

## 2025-03-19 ENCOUNTER — APPOINTMENT (OUTPATIENT)
Dept: INTERNAL MEDICINE | Facility: CLINIC | Age: 85
End: 2025-03-19
Payer: MEDICARE

## 2025-03-19 VITALS
BODY MASS INDEX: 28.04 KG/M2 | WEIGHT: 207 LBS | HEART RATE: 89 BPM | DIASTOLIC BLOOD PRESSURE: 90 MMHG | SYSTOLIC BLOOD PRESSURE: 140 MMHG | HEIGHT: 72 IN | OXYGEN SATURATION: 97 % | TEMPERATURE: 97.9 F | RESPIRATION RATE: 16 BRPM

## 2025-03-19 PROCEDURE — G2211 COMPLEX E/M VISIT ADD ON: CPT

## 2025-03-19 PROCEDURE — 99214 OFFICE O/P EST MOD 30 MIN: CPT

## 2025-03-25 ENCOUNTER — APPOINTMENT (OUTPATIENT)
Dept: FAMILY MEDICINE | Facility: CLINIC | Age: 85
End: 2025-03-25
Payer: MEDICARE

## 2025-03-25 ENCOUNTER — LABORATORY RESULT (OUTPATIENT)
Age: 85
End: 2025-03-25

## 2025-03-25 VITALS
DIASTOLIC BLOOD PRESSURE: 82 MMHG | HEIGHT: 72 IN | SYSTOLIC BLOOD PRESSURE: 139 MMHG | RESPIRATION RATE: 16 BRPM | TEMPERATURE: 97.6 F | HEART RATE: 76 BPM | OXYGEN SATURATION: 99 % | WEIGHT: 205 LBS | BODY MASS INDEX: 27.77 KG/M2

## 2025-03-25 DIAGNOSIS — Z00.00 ENCOUNTER FOR GENERAL ADULT MEDICAL EXAMINATION W/OUT ABNORMAL FINDINGS: ICD-10-CM

## 2025-03-25 DIAGNOSIS — G47.33 OBSTRUCTIVE SLEEP APNEA (ADULT) (PEDIATRIC): ICD-10-CM

## 2025-03-25 DIAGNOSIS — J30.9 ALLERGIC RHINITIS, UNSPECIFIED: ICD-10-CM

## 2025-03-25 DIAGNOSIS — M10.9 GOUT, UNSPECIFIED: ICD-10-CM

## 2025-03-25 DIAGNOSIS — C61 MALIGNANT NEOPLASM OF PROSTATE: ICD-10-CM

## 2025-03-25 PROCEDURE — G0439: CPT

## 2025-03-25 PROCEDURE — 36415 COLL VENOUS BLD VENIPUNCTURE: CPT

## 2025-03-26 LAB
ALBUMIN SERPL ELPH-MCNC: 4.2 G/DL
ALP BLD-CCNC: 62 U/L
ALT SERPL-CCNC: 27 U/L
ANION GAP SERPL CALC-SCNC: 13 MMOL/L
AST SERPL-CCNC: 24 U/L
BASOPHILS # BLD AUTO: 0.04 K/UL
BASOPHILS NFR BLD AUTO: 0.7 %
BILIRUB SERPL-MCNC: 1.1 MG/DL
BUN SERPL-MCNC: 34 MG/DL
CALCIUM SERPL-MCNC: 9.9 MG/DL
CHLORIDE SERPL-SCNC: 105 MMOL/L
CHOLEST SERPL-MCNC: 137 MG/DL
CO2 SERPL-SCNC: 23 MMOL/L
CREAT SERPL-MCNC: 1.39 MG/DL
EGFRCR SERPLBLD CKD-EPI 2021: 50 ML/MIN/1.73M2
EOSINOPHIL # BLD AUTO: 0.04 K/UL
EOSINOPHIL NFR BLD AUTO: 0.7 %
FERRITIN SERPL-MCNC: 323 NG/ML
FOLATE SERPL-MCNC: >20 NG/ML
GLUCOSE SERPL-MCNC: 117 MG/DL
HCT VFR BLD CALC: 44.5 %
HDLC SERPL-MCNC: 54 MG/DL
HGB BLD-MCNC: 14 G/DL
IMM GRANULOCYTES NFR BLD AUTO: 0.3 %
IRON SATN MFR SERPL: 23 %
IRON SERPL-MCNC: 56 UG/DL
LDLC SERPL-MCNC: 66 MG/DL
LYMPHOCYTES # BLD AUTO: 1.13 K/UL
LYMPHOCYTES NFR BLD AUTO: 18.8 %
MAN DIFF?: NORMAL
MCHC RBC-ENTMCNC: 29.7 PG
MCHC RBC-ENTMCNC: 31.5 G/DL
MCV RBC AUTO: 94.3 FL
MONOCYTES # BLD AUTO: 0.56 K/UL
MONOCYTES NFR BLD AUTO: 9.3 %
NEUTROPHILS # BLD AUTO: 4.23 K/UL
NEUTROPHILS NFR BLD AUTO: 70.2 %
NONHDLC SERPL-MCNC: 83 MG/DL
PLATELET # BLD AUTO: 123 K/UL
POTASSIUM SERPL-SCNC: 4.5 MMOL/L
PROT SERPL-MCNC: 6.5 G/DL
RBC # BLD: 4.72 M/UL
RBC # FLD: 15.3 %
SODIUM SERPL-SCNC: 141 MMOL/L
TIBC SERPL-MCNC: 239 UG/DL
TRIGL SERPL-MCNC: 87 MG/DL
TSH SERPL-ACNC: 2.69 UIU/ML
UIBC SERPL-MCNC: 183 UG/DL
URATE SERPL-MCNC: 3.9 MG/DL
VIT B12 SERPL-MCNC: 661 PG/ML
WBC # FLD AUTO: 6.02 K/UL

## 2025-04-03 ENCOUNTER — NON-APPOINTMENT (OUTPATIENT)
Age: 85
End: 2025-04-03

## 2025-04-04 ENCOUNTER — NON-APPOINTMENT (OUTPATIENT)
Age: 85
End: 2025-04-04

## 2025-04-04 ENCOUNTER — APPOINTMENT (OUTPATIENT)
Dept: ELECTROPHYSIOLOGY | Facility: CLINIC | Age: 85
End: 2025-04-04
Payer: MEDICARE

## 2025-04-04 VITALS
WEIGHT: 190 LBS | DIASTOLIC BLOOD PRESSURE: 87 MMHG | BODY MASS INDEX: 25.73 KG/M2 | HEART RATE: 81 BPM | OXYGEN SATURATION: 100 % | RESPIRATION RATE: 14 BRPM | HEIGHT: 72 IN | SYSTOLIC BLOOD PRESSURE: 146 MMHG

## 2025-04-04 DIAGNOSIS — R60.0 LOCALIZED EDEMA: ICD-10-CM

## 2025-04-04 DIAGNOSIS — I48.0 PAROXYSMAL ATRIAL FIBRILLATION: ICD-10-CM

## 2025-04-04 PROCEDURE — 93285 PRGRMG DEV EVAL SCRMS IP: CPT

## 2025-04-04 PROCEDURE — 99205 OFFICE O/P NEW HI 60 MIN: CPT

## 2025-04-04 RX ORDER — AMIODARONE HYDROCHLORIDE 200 MG/1
200 TABLET ORAL
Qty: 50 | Refills: 0 | Status: ACTIVE | COMMUNITY
Start: 2025-04-04 | End: 1900-01-01

## 2025-04-04 RX ORDER — FUROSEMIDE 20 MG/1
20 TABLET ORAL DAILY
Refills: 0 | Status: ACTIVE | COMMUNITY

## 2025-04-09 ENCOUNTER — EMERGENCY (EMERGENCY)
Facility: HOSPITAL | Age: 85
LOS: 1 days | End: 2025-04-09
Attending: STUDENT IN AN ORGANIZED HEALTH CARE EDUCATION/TRAINING PROGRAM | Admitting: STUDENT IN AN ORGANIZED HEALTH CARE EDUCATION/TRAINING PROGRAM
Payer: MEDICARE

## 2025-04-09 VITALS
TEMPERATURE: 98 F | HEIGHT: 72 IN | SYSTOLIC BLOOD PRESSURE: 149 MMHG | HEART RATE: 107 BPM | RESPIRATION RATE: 16 BRPM | DIASTOLIC BLOOD PRESSURE: 77 MMHG | WEIGHT: 190.04 LBS | OXYGEN SATURATION: 97 %

## 2025-04-09 VITALS
TEMPERATURE: 98 F | SYSTOLIC BLOOD PRESSURE: 145 MMHG | OXYGEN SATURATION: 96 % | DIASTOLIC BLOOD PRESSURE: 77 MMHG | HEART RATE: 96 BPM | RESPIRATION RATE: 17 BRPM

## 2025-04-09 DIAGNOSIS — Z98.890 OTHER SPECIFIED POSTPROCEDURAL STATES: Chronic | ICD-10-CM

## 2025-04-09 PROCEDURE — 70450 CT HEAD/BRAIN W/O DYE: CPT | Mod: MC

## 2025-04-09 PROCEDURE — 70450 CT HEAD/BRAIN W/O DYE: CPT | Mod: 26

## 2025-04-09 PROCEDURE — 99284 EMERGENCY DEPT VISIT MOD MDM: CPT | Mod: 25

## 2025-04-09 PROCEDURE — 99284 EMERGENCY DEPT VISIT MOD MDM: CPT

## 2025-04-09 RX ORDER — ACETAMINOPHEN 500 MG/5ML
650 LIQUID (ML) ORAL ONCE
Refills: 0 | Status: DISCONTINUED | OUTPATIENT
Start: 2025-04-09 | End: 2025-04-13

## 2025-05-09 ENCOUNTER — RESULT REVIEW (OUTPATIENT)
Age: 85
End: 2025-05-09

## 2025-05-09 ENCOUNTER — OUTPATIENT (OUTPATIENT)
Dept: OUTPATIENT SERVICES | Facility: HOSPITAL | Age: 85
LOS: 1 days | End: 2025-05-09
Payer: MEDICARE

## 2025-05-09 ENCOUNTER — APPOINTMENT (OUTPATIENT)
Dept: CV DIAGNOSITCS | Facility: HOSPITAL | Age: 85
End: 2025-05-09

## 2025-05-09 ENCOUNTER — TRANSCRIPTION ENCOUNTER (OUTPATIENT)
Age: 85
End: 2025-05-09

## 2025-05-09 VITALS
TEMPERATURE: 98 F | OXYGEN SATURATION: 100 % | SYSTOLIC BLOOD PRESSURE: 128 MMHG | HEIGHT: 72 IN | WEIGHT: 192.02 LBS | RESPIRATION RATE: 16 BRPM | HEART RATE: 113 BPM | DIASTOLIC BLOOD PRESSURE: 95 MMHG

## 2025-05-09 VITALS
SYSTOLIC BLOOD PRESSURE: 134 MMHG | HEART RATE: 66 BPM | DIASTOLIC BLOOD PRESSURE: 71 MMHG | OXYGEN SATURATION: 100 % | RESPIRATION RATE: 16 BRPM

## 2025-05-09 DIAGNOSIS — I48.91 UNSPECIFIED ATRIAL FIBRILLATION: ICD-10-CM

## 2025-05-09 DIAGNOSIS — Z98.890 OTHER SPECIFIED POSTPROCEDURAL STATES: Chronic | ICD-10-CM

## 2025-05-09 LAB
ANION GAP SERPL CALC-SCNC: 15 MMOL/L — SIGNIFICANT CHANGE UP (ref 5–17)
BUN SERPL-MCNC: 33 MG/DL — HIGH (ref 7–23)
CALCIUM SERPL-MCNC: 9.8 MG/DL — SIGNIFICANT CHANGE UP (ref 8.4–10.5)
CHLORIDE SERPL-SCNC: 104 MMOL/L — SIGNIFICANT CHANGE UP (ref 96–108)
CO2 SERPL-SCNC: 21 MMOL/L — LOW (ref 22–31)
CREAT SERPL-MCNC: 1.68 MG/DL — HIGH (ref 0.5–1.3)
EGFR: 40 ML/MIN/1.73M2 — LOW
EGFR: 40 ML/MIN/1.73M2 — LOW
GLUCOSE SERPL-MCNC: 114 MG/DL — HIGH (ref 70–99)
HCT VFR BLD CALC: 43.2 % — SIGNIFICANT CHANGE UP (ref 39–50)
HGB BLD-MCNC: 14.1 G/DL — SIGNIFICANT CHANGE UP (ref 13–17)
MAGNESIUM SERPL-MCNC: 2.2 MG/DL — SIGNIFICANT CHANGE UP (ref 1.6–2.6)
MCHC RBC-ENTMCNC: 29.6 PG — SIGNIFICANT CHANGE UP (ref 27–34)
MCHC RBC-ENTMCNC: 32.6 G/DL — SIGNIFICANT CHANGE UP (ref 32–36)
MCV RBC AUTO: 90.6 FL — SIGNIFICANT CHANGE UP (ref 80–100)
NRBC BLD AUTO-RTO: 0 /100 WBCS — SIGNIFICANT CHANGE UP (ref 0–0)
PLATELET # BLD AUTO: 121 K/UL — LOW (ref 150–400)
POTASSIUM SERPL-MCNC: 4.3 MMOL/L — SIGNIFICANT CHANGE UP (ref 3.5–5.3)
POTASSIUM SERPL-SCNC: 4.3 MMOL/L — SIGNIFICANT CHANGE UP (ref 3.5–5.3)
RBC # BLD: 4.77 M/UL — SIGNIFICANT CHANGE UP (ref 4.2–5.8)
RBC # FLD: 15.5 % — HIGH (ref 10.3–14.5)
SODIUM SERPL-SCNC: 140 MMOL/L — SIGNIFICANT CHANGE UP (ref 135–145)
WBC # BLD: 5.84 K/UL — SIGNIFICANT CHANGE UP (ref 3.8–10.5)
WBC # FLD AUTO: 5.84 K/UL — SIGNIFICANT CHANGE UP (ref 3.8–10.5)

## 2025-05-09 PROCEDURE — 93312 ECHO TRANSESOPHAGEAL: CPT

## 2025-05-09 PROCEDURE — 92960 CARDIOVERSION ELECTRIC EXT: CPT

## 2025-05-09 PROCEDURE — 93320 DOPPLER ECHO COMPLETE: CPT | Mod: 26

## 2025-05-09 PROCEDURE — 93312 ECHO TRANSESOPHAGEAL: CPT | Mod: 26

## 2025-05-09 PROCEDURE — 83735 ASSAY OF MAGNESIUM: CPT

## 2025-05-09 PROCEDURE — 80048 BASIC METABOLIC PNL TOTAL CA: CPT

## 2025-05-09 PROCEDURE — 76376 3D RENDER W/INTRP POSTPROCES: CPT | Mod: 26

## 2025-05-09 PROCEDURE — ZZZZZ: CPT

## 2025-05-09 PROCEDURE — 85027 COMPLETE CBC AUTOMATED: CPT

## 2025-05-09 PROCEDURE — 93325 DOPPLER ECHO COLOR FLOW MAPG: CPT | Mod: 26

## 2025-05-09 PROCEDURE — 93325 DOPPLER ECHO COLOR FLOW MAPG: CPT

## 2025-05-09 PROCEDURE — 76376 3D RENDER W/INTRP POSTPROCES: CPT

## 2025-05-09 PROCEDURE — 93320 DOPPLER ECHO COMPLETE: CPT

## 2025-05-09 PROCEDURE — 93005 ELECTROCARDIOGRAM TRACING: CPT | Mod: XU

## 2025-05-09 PROCEDURE — 93010 ELECTROCARDIOGRAM REPORT: CPT

## 2025-05-09 RX ORDER — AMIODARONE HYDROCHLORIDE 50 MG/ML
1 INJECTION, SOLUTION INTRAVENOUS
Refills: 0 | DISCHARGE

## 2025-05-09 NOTE — ASU DISCHARGE PLAN (ADULT/PEDIATRIC) - CARE PROVIDER_API CALL
Ephraim Person.  Cardiac Electrophysiology  [address] [place] [id number]  Phone: [phone]  Fax: [phone]  Established Patient  Follow Up Time:

## 2025-05-09 NOTE — PACU DISCHARGE NOTE - NSPTMEETSDISCHCRITERIADT_GEN_A_CORE
Allergy warning for Volumen barium sulfate indicating contraindication for statin support therapy. Called pharmacy Per Jj Liu (pharmacist), it is okay and safe to give patient. 09-May-2025 10:47

## 2025-05-09 NOTE — H&P CARDIOLOGY - HISTORY OF PRESENT ILLNESS
83 y/o male with PMHx of Afib on Eliquis, s/p ILR placement, prostate cancer, JASMIN. Gout who presents today for MARCI with DCCV.    83 y/o male with PMHx of Afib on Eliquis, s/p ILR placement, prostate cancer, JASMIN. Gout who presents today for MARCI with DCCV. Pt denies any chest pain SOB/ palpitations

## 2025-05-09 NOTE — ASU DISCHARGE PLAN (ADULT/PEDIATRIC) - FINANCIAL ASSISTANCE
[facility] provides services at a reduced cost to those who are determined to be eligible through [facility]’s financial assistance program. Information regarding [facility]’s financial assistance program can be found by going to [email or web address] or by calling [phone].

## 2025-05-22 ENCOUNTER — RX RENEWAL (OUTPATIENT)
Age: 85
End: 2025-05-22

## 2025-06-06 RX ORDER — DIPHENHYDRAMINE HCL 25 MG
25 CAPSULE ORAL
Refills: 0 | Status: ACTIVE | COMMUNITY

## 2025-06-09 ENCOUNTER — NON-APPOINTMENT (OUTPATIENT)
Age: 85
End: 2025-06-09

## 2025-06-10 RX ORDER — PREDNISONE 50 MG/1
50 TABLET ORAL
Qty: 3 | Refills: 0 | Status: ACTIVE | COMMUNITY
Start: 1900-01-01 | End: 1900-01-01

## 2025-06-13 ENCOUNTER — TRANSCRIPTION ENCOUNTER (OUTPATIENT)
Age: 85
End: 2025-06-13

## 2025-06-13 ENCOUNTER — INPATIENT (INPATIENT)
Facility: HOSPITAL | Age: 85
LOS: 0 days | Discharge: HOME | DRG: 274 | End: 2025-06-14
Attending: INTERNAL MEDICINE | Admitting: INTERNAL MEDICINE
Payer: COMMERCIAL

## 2025-06-13 VITALS
DIASTOLIC BLOOD PRESSURE: 96 MMHG | RESPIRATION RATE: 16 BRPM | HEART RATE: 83 BPM | OXYGEN SATURATION: 99 % | HEIGHT: 72 IN | TEMPERATURE: 98 F | SYSTOLIC BLOOD PRESSURE: 142 MMHG | WEIGHT: 190.04 LBS

## 2025-06-13 DIAGNOSIS — I48.91 UNSPECIFIED ATRIAL FIBRILLATION: ICD-10-CM

## 2025-06-13 DIAGNOSIS — Z98.890 OTHER SPECIFIED POSTPROCEDURAL STATES: Chronic | ICD-10-CM

## 2025-06-13 LAB
ANION GAP SERPL CALC-SCNC: 18 MMOL/L — HIGH (ref 5–17)
BUN SERPL-MCNC: 35 MG/DL — HIGH (ref 7–23)
CALCIUM SERPL-MCNC: 10 MG/DL — SIGNIFICANT CHANGE UP (ref 8.4–10.5)
CHLORIDE SERPL-SCNC: 104 MMOL/L — SIGNIFICANT CHANGE UP (ref 96–108)
CO2 SERPL-SCNC: 19 MMOL/L — LOW (ref 22–31)
CREAT SERPL-MCNC: 1.44 MG/DL — HIGH (ref 0.5–1.3)
EGFR: 48 ML/MIN/1.73M2 — LOW
EGFR: 48 ML/MIN/1.73M2 — LOW
GLUCOSE SERPL-MCNC: 163 MG/DL — HIGH (ref 70–99)
HCT VFR BLD CALC: 44.9 % — SIGNIFICANT CHANGE UP (ref 39–50)
HGB BLD-MCNC: 14.6 G/DL — SIGNIFICANT CHANGE UP (ref 13–17)
MCHC RBC-ENTMCNC: 29.3 PG — SIGNIFICANT CHANGE UP (ref 27–34)
MCHC RBC-ENTMCNC: 32.5 G/DL — SIGNIFICANT CHANGE UP (ref 32–36)
MCV RBC AUTO: 90.2 FL — SIGNIFICANT CHANGE UP (ref 80–100)
NRBC BLD AUTO-RTO: 0 /100 WBCS — SIGNIFICANT CHANGE UP (ref 0–0)
PLATELET # BLD AUTO: 122 K/UL — LOW (ref 150–400)
POTASSIUM SERPL-MCNC: 4.3 MMOL/L — SIGNIFICANT CHANGE UP (ref 3.5–5.3)
POTASSIUM SERPL-SCNC: 4.3 MMOL/L — SIGNIFICANT CHANGE UP (ref 3.5–5.3)
RBC # BLD: 4.98 M/UL — SIGNIFICANT CHANGE UP (ref 4.2–5.8)
RBC # FLD: 16.5 % — HIGH (ref 10.3–14.5)
SODIUM SERPL-SCNC: 141 MMOL/L — SIGNIFICANT CHANGE UP (ref 135–145)
WBC # BLD: 5.97 K/UL — SIGNIFICANT CHANGE UP (ref 3.8–10.5)
WBC # FLD AUTO: 5.97 K/UL — SIGNIFICANT CHANGE UP (ref 3.8–10.5)

## 2025-06-13 PROCEDURE — 93655 ICAR CATH ABLTJ DSCRT ARRHYT: CPT

## 2025-06-13 PROCEDURE — 93010 ELECTROCARDIOGRAM REPORT: CPT

## 2025-06-13 PROCEDURE — 93657 TX L/R ATRIAL FIB ADDL: CPT

## 2025-06-13 PROCEDURE — 93656 COMPRE EP EVAL ABLTJ ATR FIB: CPT

## 2025-06-13 RX ORDER — B1/B2/B3/B5/B6/B12/VIT C/FOLIC 500-0.5 MG
1 TABLET ORAL DAILY
Refills: 0 | Status: DISCONTINUED | OUTPATIENT
Start: 2025-06-13 | End: 2025-06-14

## 2025-06-13 RX ORDER — METOPROLOL SUCCINATE 50 MG/1
25 TABLET, EXTENDED RELEASE ORAL DAILY
Refills: 0 | Status: DISCONTINUED | OUTPATIENT
Start: 2025-06-13 | End: 2025-06-14

## 2025-06-13 RX ORDER — APIXABAN 2.5 MG/1
5 TABLET, FILM COATED ORAL
Refills: 0 | Status: DISCONTINUED | OUTPATIENT
Start: 2025-06-13 | End: 2025-06-14

## 2025-06-13 RX ORDER — DIPHENHYDRAMINE HCL 12.5MG/5ML
50 ELIXIR ORAL ONCE
Refills: 0 | Status: DISCONTINUED | OUTPATIENT
Start: 2025-06-13 | End: 2025-06-13

## 2025-06-13 RX ORDER — APIXABAN 2.5 MG/1
2.5 TABLET, FILM COATED ORAL EVERY 12 HOURS
Refills: 0 | Status: DISCONTINUED | OUTPATIENT
Start: 2025-06-13 | End: 2025-06-13

## 2025-06-13 RX ORDER — METOPROLOL SUCCINATE 50 MG/1
1 TABLET, EXTENDED RELEASE ORAL
Refills: 0 | DISCHARGE

## 2025-06-13 RX ADMIN — APIXABAN 5 MILLIGRAM(S): 2.5 TABLET, FILM COATED ORAL at 23:31

## 2025-06-13 NOTE — PRE-ANESTHESIA EVALUATION ADULT - NSANTHPMHFT_GEN_ALL_CORE
83 y/o male with PMHx of pAfib now recently became persistent (on Eliquis last dose this am, 6/13, reports no interruptions), ILR placement, prostate cancer, JASMIN on BIPAP. Gout, s/p recent MARCI with DCCV on 5/9/25, lasted in SR for 2 days (amiodarone briefly started then discontinued when pt went back in Afib).  Patient reports symptoms of palpitations, dyspnea, fatigue limiting his activities and presents for planned Afib ablation with Dr Person.  Denies CP, dizziness, diaphoresis, palpitations, nausea, vomiting, peripheral edema, recent weight gain, or syncope.      *** Pt premedicated with prednisone 50mg po prior procedure for iodine based contrast allergy (rash)

## 2025-06-13 NOTE — DISCHARGE NOTE PROVIDER - CARE PROVIDER_API CALL
Ephraim Person.  Cardiac Electrophysiology  [address] [place] [id number]  Phone: [phone]  Fax: [phone]  Scheduled Appointment: 10/07/2025 11:30 AM

## 2025-06-13 NOTE — DISCHARGE NOTE PROVIDER - HOSPITAL COURSE
84 year old w/ PMHx of  pAfib now recently became persistent (on Eliquis last dose this am, 6/13, reports no interruptions), ILR placement, prostate cancer, JASMIN on BIPAPpresented to hospital for atrial fibrillation ablation.  On 6/13, patient underwent a successful atrial fibrillation ablation via right femoral vein. Hemostasis achieved via vascade closure devices ; site stable -- soft, nontender, no hematoma. Denies any complaints at this time. Patient remained hemodynamically stable post-procedure. Remained overnight for observation and pain control. Continue Eliquis and Metoprolol. Patient has been medically cleared for discharge as per Dr. Person. Patient has been given appropriate discharge instructions including medication regimen, access site management and follow up. Medications that patient needs refills on (+/- new medications) have been e-prescribed to preferred pharmacy. Patient will f/u with Dr. Person at scheduled post-ablation appointment for further management.

## 2025-06-13 NOTE — PATIENT PROFILE ADULT - FALL HARM RISK - UNIVERSAL INTERVENTIONS
Bed in lowest position, wheels locked, appropriate side rails in place/Call bell, personal items and telephone in reach/Instruct patient to call for assistance before getting out of bed or chair/Non-slip footwear when patient is out of bed/[employer] to call system/Physically safe environment - no spills, clutter or unnecessary equipment/Purposeful Proactive Rounding/Room/bathroom lighting operational, light cord in reach

## 2025-06-13 NOTE — DISCHARGE NOTE PROVIDER - NSDCFUSCHEDAPPT_GEN_ALL_CORE_FT
Sharron Jessica  [facility]  [facility] [address]  Scheduled Appointment: 06/18/2025

## 2025-06-13 NOTE — H&P CARDIOLOGY - EKG AND INTERPRETATION
Request for Sleep Study result faxed to [facility] at [phone]. Afib with RVR, 101bpm

## 2025-06-13 NOTE — DISCHARGE NOTE PROVIDER - NSDCCPTREATMENT_GEN_ALL_CORE_FT
PRINCIPAL PROCEDURE  Procedure: Cardiac ablation  Findings and Treatment:      PRINCIPAL PROCEDURE  Procedure: Cardiac ablation  Findings and Treatment: successful ablation for persistent afib via RFV x 3 Vascade without complication

## 2025-06-13 NOTE — H&P CARDIOLOGY - HISTORY OF PRESENT ILLNESS
83 y/o male with PMHx of Afib on Eliquis, s/p ILR placement, prostate cancer, JASMIN. Gout, s/p recent MARCI with DCCV on 5/9/25 who presents for Afib ablation with Dr Person.     85 y/o male with PMHx of pAfib now recently became persistent (on Eliquis), ILR placement, prostate cancer, JASMIN on BIPAP. Gout, s/p recent MARCI with DCCV on 5/9/25, lasted in SR for 2 days (amiodarone briefly started then discontinued when pt went back in Afib).  Patient presents for planned Afib ablation with Dr Person.  Denies CP, dizziness, diaphoresis, palpitations, nausea, vomiting, peripheral edema, recent weight gain, or syncope.      *** Pt premedicated with prednisone 50mg po prior procedure for iodine based contrast allergy (rash)     85 y/o male with PMHx of pAfib now recently became persistent (on Eliquis last dose this am, 6/13, reports no interruptions), ILR placement, prostate cancer, JASMIN on BIPAP. Gout, s/p recent MARCI with DCCV on 5/9/25, lasted in SR for 2 days (amiodarone briefly started then discontinued when pt went back in Afib).  Patient reports symptoms of palpitations, dyspnea, fatigue limiting his activities and presents for planned Afib ablation with Dr Person.  Denies CP, dizziness, diaphoresis, palpitations, nausea, vomiting, peripheral edema, recent weight gain, or syncope.      *** Pt premedicated with prednisone 50mg po prior procedure for iodine based contrast allergy (rash)      Referring dr Dr Howie Pak    < from: MARCI W or WO Ultrasound Enhancing Agent (05.09.25 @ 09:04) >  CONCLUSIONS:      1. Left ventricular systolic function is normal with an ejection fraction visually estimated at60 to 65 %.   2. Left atrium is normal in size.   3. 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   4. Mild aortic regurgitation.   5. No thrombus seen in the left atrial, left atrial appendage, right atrial or right atrial appendage.   6. Moderate tricuspid regurgitation.   7. Normal right ventricular cavity size and normal right ventricular systolic function.   8. Findings were discussed with Dr. Person on 5/9/2025 at 11:25am. No prior echocardiogram is available for comparison.   9. The patient was successfully cardioverted to sinus rhythm.      < end of copied text >

## 2025-06-13 NOTE — DISCHARGE NOTE PROVIDER - NSDCMRMEDTOKEN_GEN_ALL_CORE_FT
allopurinol 300 mg oral tablet: 1 tab(s) orally once a day  Eliquis 5 mg oral tablet: 0.5 tab(s) orally 2 times a day  Metoprolol Succinate ER 25 mg oral tablet, extended release: 1 tab(s) orally once a day  Multi Vitamin+: 1 tab(s) orally once a day  Prevagen 50 mcg (2000 intl units) oral capsule: 1 cap(s) orally once a day (at bedtime)  tamsulosin 0.4 mg oral capsule: 1 cap(s) orally once a day   allopurinol 300 mg oral tablet: 1 tab(s) orally once a day  Eliquis 5 mg oral tablet: 1 tab(s) orally 2 times a day  Metoprolol Succinate ER 25 mg oral tablet, extended release: 1 tab(s) orally once a day  Multi Vitamin+: 1 tab(s) orally once a day  Prevagen 50 mcg (2000 intl units) oral capsule: 1 cap(s) orally once a day (at bedtime)  tamsulosin 0.4 mg oral capsule: 1 cap(s) orally once a day

## 2025-06-14 ENCOUNTER — TRANSCRIPTION ENCOUNTER (OUTPATIENT)
Age: 85
End: 2025-06-14

## 2025-06-14 VITALS
OXYGEN SATURATION: 96 % | SYSTOLIC BLOOD PRESSURE: 127 MMHG | TEMPERATURE: 98 F | RESPIRATION RATE: 16 BRPM | DIASTOLIC BLOOD PRESSURE: 69 MMHG | HEART RATE: 64 BPM

## 2025-06-14 LAB
ANION GAP SERPL CALC-SCNC: 15 MMOL/L — SIGNIFICANT CHANGE UP (ref 5–17)
BUN SERPL-MCNC: 39 MG/DL — HIGH (ref 7–23)
CALCIUM SERPL-MCNC: 9.1 MG/DL — SIGNIFICANT CHANGE UP (ref 8.4–10.5)
CHLORIDE SERPL-SCNC: 110 MMOL/L — HIGH (ref 96–108)
CO2 SERPL-SCNC: 15 MMOL/L — LOW (ref 22–31)
CREAT SERPL-MCNC: 1.44 MG/DL — HIGH (ref 0.5–1.3)
EGFR: 48 ML/MIN/1.73M2 — LOW
EGFR: 48 ML/MIN/1.73M2 — LOW
GLUCOSE SERPL-MCNC: 155 MG/DL — HIGH (ref 70–99)
HCT VFR BLD CALC: 41.9 % — SIGNIFICANT CHANGE UP (ref 39–50)
HGB BLD-MCNC: 13.5 G/DL — SIGNIFICANT CHANGE UP (ref 13–17)
MAGNESIUM SERPL-MCNC: 2.2 MG/DL — SIGNIFICANT CHANGE UP (ref 1.6–2.6)
MCHC RBC-ENTMCNC: 29.1 PG — SIGNIFICANT CHANGE UP (ref 27–34)
MCHC RBC-ENTMCNC: 32.2 G/DL — SIGNIFICANT CHANGE UP (ref 32–36)
MCV RBC AUTO: 90.3 FL — SIGNIFICANT CHANGE UP (ref 80–100)
NRBC BLD AUTO-RTO: 0 /100 WBCS — SIGNIFICANT CHANGE UP (ref 0–0)
PLATELET # BLD AUTO: 111 K/UL — LOW (ref 150–400)
POTASSIUM SERPL-MCNC: 4.8 MMOL/L — SIGNIFICANT CHANGE UP (ref 3.5–5.3)
POTASSIUM SERPL-SCNC: 4.8 MMOL/L — SIGNIFICANT CHANGE UP (ref 3.5–5.3)
RBC # BLD: 4.64 M/UL — SIGNIFICANT CHANGE UP (ref 4.2–5.8)
RBC # FLD: 16.8 % — HIGH (ref 10.3–14.5)
SODIUM SERPL-SCNC: 140 MMOL/L — SIGNIFICANT CHANGE UP (ref 135–145)
WBC # BLD: 10.53 K/UL — HIGH (ref 3.8–10.5)
WBC # FLD AUTO: 10.53 K/UL — HIGH (ref 3.8–10.5)

## 2025-06-14 PROCEDURE — C1759: CPT

## 2025-06-14 PROCEDURE — C1730: CPT

## 2025-06-14 PROCEDURE — 93655 ICAR CATH ABLTJ DSCRT ARRHYT: CPT

## 2025-06-14 PROCEDURE — 86901 BLOOD TYPING SEROLOGIC RH(D): CPT

## 2025-06-14 PROCEDURE — 93005 ELECTROCARDIOGRAM TRACING: CPT

## 2025-06-14 PROCEDURE — 86850 RBC ANTIBODY SCREEN: CPT

## 2025-06-14 PROCEDURE — 83735 ASSAY OF MAGNESIUM: CPT

## 2025-06-14 PROCEDURE — 80048 BASIC METABOLIC PNL TOTAL CA: CPT

## 2025-06-14 PROCEDURE — C1769: CPT

## 2025-06-14 PROCEDURE — 85027 COMPLETE CBC AUTOMATED: CPT

## 2025-06-14 PROCEDURE — 93657 TX L/R ATRIAL FIB ADDL: CPT

## 2025-06-14 PROCEDURE — C1766: CPT

## 2025-06-14 PROCEDURE — C1894: CPT

## 2025-06-14 PROCEDURE — C1887: CPT

## 2025-06-14 PROCEDURE — 86900 BLOOD TYPING SEROLOGIC ABO: CPT

## 2025-06-14 PROCEDURE — C1733: CPT

## 2025-06-14 PROCEDURE — 93656 COMPRE EP EVAL ABLTJ ATR FIB: CPT

## 2025-06-14 PROCEDURE — 99232 SBSQ HOSP IP/OBS MODERATE 35: CPT

## 2025-06-14 PROCEDURE — C1760: CPT

## 2025-06-14 RX ORDER — APIXABAN 2.5 MG/1
1 TABLET, FILM COATED ORAL
Qty: 0 | Refills: 0 | DISCHARGE
Start: 2025-06-14

## 2025-06-14 RX ADMIN — METOPROLOL SUCCINATE 25 MILLIGRAM(S): 50 TABLET, EXTENDED RELEASE ORAL at 05:23

## 2025-06-14 NOTE — PROVIDER CONTACT NOTE (OTHER) - SITUATION
pt lowered himself down to kneeling position using walker as a brace to  paper towel on floor in restroom, pt needed help standing back up.
first degree AV block on tele as per tele tech

## 2025-06-14 NOTE — PROVIDER CONTACT NOTE (OTHER) - ASSESSMENT
Pt is axox4. vss as charted. No bruising/cuts/bleeding noted. pt denies fall. pt states "I went to  a paper towel on the floor, kneeled down, and then couldn't get back up." Wife states she did not hear pt fall. RNsx2 assisted pt to standing position using walker. NP came to bedside. Pt denies any pain or discomfort. Right fem site stable no changes.
AO4, VSS; Pt asymptomatic, no complaints of SOB, chest pain, dizziness, or palpitations.

## 2025-06-14 NOTE — DISCHARGE NOTE NURSING/CASE MANAGEMENT/SOCIAL WORK - FINANCIAL ASSISTANCE
[facility] provides services at a reduced cost to those who are determined to be eligible through [facility]’s financial assistance program. Information regarding [facility]’s financial assistance program can be found by going to [email or web address] or by calling [phone].

## 2025-06-14 NOTE — DISCHARGE NOTE NURSING/CASE MANAGEMENT/SOCIAL WORK - NURSING SECTION COMPLETE
Please call patient to make an appointment and kenxust message sent,thank you.     · He is not on Abilify, it was discontinued Patient/Caregiver provided printed discharge information.

## 2025-06-14 NOTE — DISCHARGE NOTE NURSING/CASE MANAGEMENT/SOCIAL WORK - PATIENT PORTAL LINK FT
You can access the FollowMyHealth Patient Portal offered by [facility] by registering at the following website: [email or web address]. By joining [employer]’s FollowMyHealth portal, you will also be able to view your health information using other applications (apps) compatible with our system.

## 2025-06-14 NOTE — PROGRESS NOTE ADULT - ASSESSMENT
HPI: 83 y/o male with PMHx of pAfib now recently became persistent (on Eliquis last dose this am, 6/13, reports no interruptions), ILR placement, prostate cancer, JASMIN on BIPAP. Gout, s/p recent MARCI with DCCV on 5/9/25, lasted in SR for 2 days (amiodarone briefly started then discontinued when pt went back in Afib).  Patient reports symptoms of palpitations, dyspnea, fatigue limiting his activities and presents for planned Afib ablation with Dr Person.  Denies CP, dizziness, diaphoresis, palpitations, nausea, vomiting, peripheral edema, recent weight gain, or syncope.    *** Pt premedicated with prednisone 50mg po prior procedure for iodine based contrast allergy (rash)      Referring Dr Howie Pak    # Atrial Fibrillation  6/13 s/p Afib/Aflutter ablation via RFV w/ vascade x3  Right groin with moderate ooze- injected w/ lido/epi- now stable  Continue Metoprolol Succinate 25 mg daily  Increase Eliquis to 5 mg BID  Continue monitoring telemetry  Keep Potassium> 4.0 and Magnesium>2.0  Follow up w/ post procedure EP appt  Anticipate discharge home if telemetry stable with stable vitals and labs, if site and condition remain stable    # CKD  Monitor Cr with AM labs  Last Cr 1.44  Avoid nephrotoxic agents    # Gout  Continue Allopurinol 200 mg daily    Mike Adorno [facility]  Ext [phone]

## 2025-06-14 NOTE — DISCHARGE NOTE NURSING/CASE MANAGEMENT/SOCIAL WORK - NSDCPEFALRISK_GEN_ALL_CORE
For information on Fall & Injury Prevention, visit: [email or web address] OR  [email or web address] OR  https://www.cdc.gov/steadi/patient.html

## 2025-06-14 NOTE — PROGRESS NOTE ADULT - SUBJECTIVE AND OBJECTIVE BOX
[facility] ELECTROPHYSIOLOGY  [phone]    CHIEF COMPLAINT: Patient is a 84y old male who presents with a chief complaint of afib    HPI: 83 y/o male with PMHx of pAfib now recently became persistent (on Eliquis last dose this am, 6/13, reports no interruptions), ILR placement, prostate cancer, JASMIN on BIPAP. Gout, s/p recent MARCI with DCCV on 5/9/25, lasted in SR for 2 days (amiodarone briefly started then discontinued when pt went back in Afib).  Patient reports symptoms of palpitations, dyspnea, fatigue limiting his activities and presents for planned Afib ablation with Dr Person.  Denies CP, dizziness, diaphoresis, palpitations, nausea, vomiting, peripheral edema, recent weight gain, or syncope.      *** Pt premedicated with prednisone 50mg po prior procedure for iodine based contrast allergy (rash)      Referring dr Dr Howie Pak    Subjective/Observations: patient seen and examined.  denies chest pain, dyspena, dizziness, palpitations, N&V, HA    Review of Systems all WNL except below indicated:    Constitutional: [ ] Fever [ ] Chills [ ] Fatigue [ ] Weight change   HEENT: [ ] Blurred vision [ ] Eye Pain [ ] Headache [ ] Runny nose [ ] Sore Throat   Respiratory: [ ] Cough [ ] Wheezing [ ] Shortness of breath  Cardiovascular: [ ] Chest Pain [ ] Palpitations [ ] TAVARES [ ] PND [ ] Orthopnea  Gastrointestinal: [ ] Abdominal Pain [ ] Diarrhea [ ] Constipation [ ] Hemorrhoids [ ] Nausea [ ] Vomiting  Genitourinary: [ ] Nocturia [ ] Dysuria [ ] Incontinence  Extremities: [ ] Swelling [ ] Joint Pain  Neurologic: [ ] Focal deficit [ ] Paresthesias [ ] Syncope  Lymphatic: [ ] Swelling [ ] Lymphadenopathy   Skin: [ ] Rash [ ] Ecchymoses [ ] Wounds [ ] Lesions  Psychiatry: [ ] Depression [ ] Suicidal/Homicidal Ideation [ ] Anxiety [ ] Sleep Disturbances  [ ] 10 point review of systems is otherwise negative except as mentioned above            [ ]Unable to obtain    PAST MEDICAL & SURGICAL HISTORY:  Gout    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    BCC (basal cell carcinoma)    Prostate CA    Status post chemotherapy    S/P radiation therapy    Colon polyp    H/O lithotripsy renal    MEDICATIONS  (STANDING):  allopurinol 300 milliGRAM(s) Oral daily  apixaban 5 milliGRAM(s) Oral two times a day  metoprolol succinate ER 25 milliGRAM(s) Oral daily  multivitamin 1 Tablet(s) Oral daily    MEDICATIONS  (PRN):    Allergies    Cipro (Rash)  levofloxacin (Rash)  amoxicillin (Hives)  contrast media (iodine-based) (Rash)  penicillins (Hives)    Intolerances    Vital Signs Last 24 Hrs  T(C): 36.5 (13 Jun 2025 20:00), Max: 36.5 (13 Jun 2025 20:00)  T(F): 97.7 (13 Jun 2025 20:00), Max: 97.7 (13 Jun 2025 20:00)  HR: 61 (13 Jun 2025 23:30) (59 - 83)  BP: 134/74 (13 Jun 2025 23:30) (122/71 - 144/70)  BP(mean): 99 (13 Jun 2025 23:30) (96 - 111)  RR: 17 (14 Jun 2025 00:30) (14 - 17)  SpO2: 100% (13 Jun 2025 23:30) (97% - 100%)    Parameters below as of 14 Jun 2025 00:30  Patient On (Oxygen Delivery Method): room air    I&O's Summary    13 Jun 2025 07:01  -  14 Jun 2025 00:22  --------------------------------------------------------  IN: 0 mL / OUT: 0 mL / NET: 0 mL      Weight (kg): 86.2 (06-13 @ 13:39)    FOCUSED PHYSICAL EXAM:  Neuro: No apparent distress, alert and oriented times x3, appropriate affect  Pulmonary: Non-labored, breath sounds are clear bilaterally, No wheezing, rales or rhonchi  Cardiovascular: Regular, S1 and S2, No murmurs, rubs, gallops or clicks  Site: Right groin w/ vascade x3: Soft, non tender, no bleeding or hematoma. Pulses in the right lower extremity are palpable.   No clubbing, cyanosis or edema    LABS: All Labs Reviewed:                        14.6   5.97  )-----------( 122      ( 13 Jun 2025 12:49 )             44.9     13 Jun 2025 12:49    141    |  104    |  35     ----------------------------<  163    4.3     |  19     |  1.44     Ca    10.0       13 Jun 2025 12:49    RESULTS:    ECHO:  < from: MARCI W or WO Ultrasound Enhancing Agent (05.09.25 @ 09:04) >  CONCLUSIONS:   1. Left ventricular systolic function is normal with an ejection fraction visually estimated at60 to 65 %.   2. Left atrium is normal in size.   3. 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   4. Mild aortic regurgitation.   5. No thrombus seen in the left atrial, left atrial appendage, right atrial or right atrial appendage.   6. Moderate tricuspid regurgitation.   7. Normal right ventricular cavity size and normal right ventricular systolic function.   8. Findings were discussed with Dr. Person on 5/9/2025 at 11:25am. No prior echocardiogram is available for comparison.   9. The patient was successfully cardioverted to sinus rhythm.

## 2025-06-18 ENCOUNTER — APPOINTMENT (OUTPATIENT)
Dept: INTERNAL MEDICINE | Facility: CLINIC | Age: 85
End: 2025-06-18
Payer: MEDICARE

## 2025-06-18 VITALS
RESPIRATION RATE: 15 BRPM | DIASTOLIC BLOOD PRESSURE: 80 MMHG | WEIGHT: 205 LBS | TEMPERATURE: 97.8 F | BODY MASS INDEX: 27.77 KG/M2 | HEART RATE: 64 BPM | SYSTOLIC BLOOD PRESSURE: 138 MMHG | HEIGHT: 72 IN | OXYGEN SATURATION: 99 %

## 2025-06-18 PROBLEM — I48.91 CENTRAL SLEEP APNEA ASSOCIATED WITH ATRIAL FIBRILLATION: Status: ACTIVE | Noted: 2025-06-18

## 2025-06-18 PROCEDURE — G2211 COMPLEX E/M VISIT ADD ON: CPT

## 2025-06-18 PROCEDURE — 99214 OFFICE O/P EST MOD 30 MIN: CPT

## 2025-06-18 RX ORDER — METOPROLOL TARTRATE 25 MG/1
25 TABLET ORAL
Refills: 0 | Status: ACTIVE | COMMUNITY